# Patient Record
Sex: FEMALE | Race: WHITE | NOT HISPANIC OR LATINO | URBAN - METROPOLITAN AREA
[De-identification: names, ages, dates, MRNs, and addresses within clinical notes are randomized per-mention and may not be internally consistent; named-entity substitution may affect disease eponyms.]

---

## 2017-04-24 ENCOUNTER — OFFICE (AMBULATORY)
Dept: URBAN - METROPOLITAN AREA CLINIC 64 | Facility: CLINIC | Age: 42
End: 2017-04-24

## 2017-04-24 VITALS — SYSTOLIC BLOOD PRESSURE: 130 MMHG | WEIGHT: 232 LBS | DIASTOLIC BLOOD PRESSURE: 75 MMHG | HEIGHT: 72 IN

## 2017-04-24 DIAGNOSIS — K50.813 CROHN'S DISEASE OF BOTH SMALL AND LARGE INTESTINE WITH FISTU: ICD-10-CM

## 2017-04-24 LAB
ANA W/REFLEX: ANA DIRECT: NEGATIVE
C-REACTIVE PROTEIN, QUANT: 36.3 MG/L — HIGH (ref 0–4.9)
CBC WITH DIFFERENTIAL/PLATELET: BASO (ABSOLUTE): 0.1 X10E3/UL (ref 0–0.2)
CBC WITH DIFFERENTIAL/PLATELET: BASOS: 1 %
CBC WITH DIFFERENTIAL/PLATELET: EOS (ABSOLUTE): 0.1 X10E3/UL (ref 0–0.4)
CBC WITH DIFFERENTIAL/PLATELET: EOS: 1 %
CBC WITH DIFFERENTIAL/PLATELET: HEMATOCRIT: 42.4 % (ref 34–46.6)
CBC WITH DIFFERENTIAL/PLATELET: HEMATOLOGY COMMENTS: (no result)
CBC WITH DIFFERENTIAL/PLATELET: HEMOGLOBIN: 14.5 G/DL (ref 11.1–15.9)
CBC WITH DIFFERENTIAL/PLATELET: IMMATURE CELLS: (no result)
CBC WITH DIFFERENTIAL/PLATELET: IMMATURE GRANS (ABS): 0 X10E3/UL (ref 0–0.1)
CBC WITH DIFFERENTIAL/PLATELET: IMMATURE GRANULOCYTES: 0 %
CBC WITH DIFFERENTIAL/PLATELET: LYMPHS (ABSOLUTE): 2.5 X10E3/UL (ref 0.7–3.1)
CBC WITH DIFFERENTIAL/PLATELET: LYMPHS: 27 %
CBC WITH DIFFERENTIAL/PLATELET: MCH: 29.6 PG (ref 26.6–33)
CBC WITH DIFFERENTIAL/PLATELET: MCHC: 34.2 G/DL (ref 31.5–35.7)
CBC WITH DIFFERENTIAL/PLATELET: MCV: 87 FL (ref 79–97)
CBC WITH DIFFERENTIAL/PLATELET: MONOCYTES(ABSOLUTE): 0.8 X10E3/UL (ref 0.1–0.9)
CBC WITH DIFFERENTIAL/PLATELET: MONOCYTES: 8 %
CBC WITH DIFFERENTIAL/PLATELET: NEUTROPHILS (ABSOLUTE): 5.7 X10E3/UL (ref 1.4–7)
CBC WITH DIFFERENTIAL/PLATELET: NEUTROPHILS: 63 %
CBC WITH DIFFERENTIAL/PLATELET: NRBC: (no result)
CBC WITH DIFFERENTIAL/PLATELET: PLATELETS: 305 X10E3/UL (ref 150–379)
CBC WITH DIFFERENTIAL/PLATELET: RBC: 4.9 X10E6/UL (ref 3.77–5.28)
CBC WITH DIFFERENTIAL/PLATELET: RDW: 15.4 % (ref 12.3–15.4)
CBC WITH DIFFERENTIAL/PLATELET: WBC: 9.2 X10E3/UL (ref 3.4–10.8)
CCP ANTIBODIES IGG/IGA: 10 UNITS (ref 0–19)
COMP. METABOLIC PANEL (14): A/G RATIO: 1.2 (ref 1.2–2.2)
COMP. METABOLIC PANEL (14): ALBUMIN, SERUM: 4.2 G/DL (ref 3.5–5.5)
COMP. METABOLIC PANEL (14): ALKALINE PHOSPHATASE, S: 55 IU/L (ref 39–117)
COMP. METABOLIC PANEL (14): ALT (SGPT): 7 IU/L (ref 0–32)
COMP. METABOLIC PANEL (14): AST (SGOT): 11 IU/L (ref 0–40)
COMP. METABOLIC PANEL (14): BILIRUBIN, TOTAL: 0.3 MG/DL (ref 0–1.2)
COMP. METABOLIC PANEL (14): BUN/CREATININE RATIO: 6 — LOW (ref 9–23)
COMP. METABOLIC PANEL (14): BUN: 5 MG/DL — LOW (ref 6–24)
COMP. METABOLIC PANEL (14): CALCIUM, SERUM: 9.2 MG/DL (ref 8.7–10.2)
COMP. METABOLIC PANEL (14): CARBON DIOXIDE, TOTAL: 19 MMOL/L (ref 18–29)
COMP. METABOLIC PANEL (14): CHLORIDE, SERUM: 101 MMOL/L (ref 96–106)
COMP. METABOLIC PANEL (14): CREATININE, SERUM: 0.84 MG/DL (ref 0.57–1)
COMP. METABOLIC PANEL (14): EGFR IF AFRICN AM: 101 ML/MIN/1.73 (ref 59–?)
COMP. METABOLIC PANEL (14): EGFR IF NONAFRICN AM: 87 ML/MIN/1.73 (ref 59–?)
COMP. METABOLIC PANEL (14): GLOBULIN, TOTAL: 3.4 G/DL (ref 1.5–4.5)
COMP. METABOLIC PANEL (14): GLUCOSE, SERUM: 102 MG/DL — HIGH (ref 65–99)
COMP. METABOLIC PANEL (14): POTASSIUM, SERUM: 3.7 MMOL/L (ref 3.5–5.2)
COMP. METABOLIC PANEL (14): PROTEIN, TOTAL, SERUM: 7.6 G/DL (ref 6–8.5)
COMP. METABOLIC PANEL (14): SODIUM, SERUM: 141 MMOL/L (ref 134–144)
LIPID PANEL: CHOLESTEROL, TOTAL: 108 MG/DL (ref 100–199)
LIPID PANEL: COMMENT: (no result)
LIPID PANEL: HDL CHOLESTEROL: 40 MG/DL (ref 39–?)
LIPID PANEL: LDL CHOLESTEROL CALC: 53 MG/DL (ref 0–99)
LIPID PANEL: TRIGLYCERIDES: 77 MG/DL (ref 0–149)
LIPID PANEL: VLDL CHOLESTEROL CAL: 15 MG/DL (ref 5–40)
RHEUMATOID ARTHRITIS FACTOR: RA LATEX TURBID.: <10 IU/ML
SEDIMENTATION RATE-WESTERGREN: 42 MM/HR — HIGH (ref 0–32)

## 2017-04-24 PROCEDURE — 99244 OFF/OP CNSLTJ NEW/EST MOD 40: CPT | Performed by: NURSE PRACTITIONER

## 2017-04-24 RX ORDER — METRONIDAZOLE 500 MG/1
1500 TABLET, FILM COATED ORAL
Qty: 30 | Refills: 0 | Status: COMPLETED
Start: 2017-04-24 | End: 2017-07-11

## 2017-04-24 RX ORDER — DICYCLOMINE HYDROCHLORIDE 20 MG/1
80 TABLET ORAL
Qty: 120 | Refills: 5 | Status: COMPLETED
Start: 2017-04-24 | End: 2017-11-21

## 2017-04-24 RX ORDER — PROMETHAZINE HYDROCHLORIDE 25 MG/1
TABLET ORAL
Qty: 60 | Refills: 1 | Status: ACTIVE

## 2017-04-24 RX ORDER — ONDANSETRON 4 MG/1
TABLET, ORALLY DISINTEGRATING ORAL
Qty: 45 | Refills: 1 | Status: ACTIVE

## 2017-04-24 RX ORDER — PREDNISONE 10 MG/1
TABLET ORAL
Qty: 105 | Refills: 0 | Status: COMPLETED
Start: 2017-04-24 | End: 2017-07-11

## 2017-07-11 ENCOUNTER — OFFICE (AMBULATORY)
Dept: URBAN - METROPOLITAN AREA CLINIC 64 | Facility: CLINIC | Age: 42
End: 2017-07-11
Payer: COMMERCIAL

## 2017-07-11 VITALS
WEIGHT: 233 LBS | HEART RATE: 95 BPM | DIASTOLIC BLOOD PRESSURE: 82 MMHG | HEIGHT: 72 IN | SYSTOLIC BLOOD PRESSURE: 114 MMHG

## 2017-07-11 DIAGNOSIS — R11.2 NAUSEA WITH VOMITING, UNSPECIFIED: ICD-10-CM

## 2017-07-11 DIAGNOSIS — K50.90 CROHN'S DISEASE, UNSPECIFIED, WITHOUT COMPLICATIONS: ICD-10-CM

## 2017-07-11 DIAGNOSIS — R19.7 DIARRHEA, UNSPECIFIED: ICD-10-CM

## 2017-07-11 DIAGNOSIS — R10.9 UNSPECIFIED ABDOMINAL PAIN: ICD-10-CM

## 2017-07-11 PROCEDURE — 99214 OFFICE O/P EST MOD 30 MIN: CPT | Performed by: NURSE PRACTITIONER

## 2017-07-11 RX ORDER — PREDNISONE 10 MG/1
TABLET ORAL
Qty: 100 | Refills: 0 | Status: COMPLETED
Start: 2017-07-11 | End: 2017-08-14

## 2017-07-25 ENCOUNTER — ON CAMPUS - OUTPATIENT (AMBULATORY)
Dept: URBAN - NONMETROPOLITAN AREA HOSPITAL 6 | Facility: HOSPITAL | Age: 42
End: 2017-07-25
Payer: COMMERCIAL

## 2017-07-25 DIAGNOSIS — K50.10 CROHN'S DISEASE OF LARGE INTESTINE WITHOUT COMPLICATIONS: ICD-10-CM

## 2017-07-25 DIAGNOSIS — R11.2 NAUSEA WITH VOMITING, UNSPECIFIED: ICD-10-CM

## 2017-07-25 DIAGNOSIS — K44.9 DIAPHRAGMATIC HERNIA WITHOUT OBSTRUCTION OR GANGRENE: ICD-10-CM

## 2017-07-25 DIAGNOSIS — D12.6 BENIGN NEOPLASM OF COLON, UNSPECIFIED: ICD-10-CM

## 2017-07-25 DIAGNOSIS — K63.3 ULCER OF INTESTINE: ICD-10-CM

## 2017-07-25 DIAGNOSIS — K29.70 GASTRITIS, UNSPECIFIED, WITHOUT BLEEDING: ICD-10-CM

## 2017-07-25 PROCEDURE — 43239 EGD BIOPSY SINGLE/MULTIPLE: CPT | Performed by: INTERNAL MEDICINE

## 2017-07-25 PROCEDURE — 45380 COLONOSCOPY AND BIOPSY: CPT | Performed by: INTERNAL MEDICINE

## 2017-08-14 ENCOUNTER — OFFICE (AMBULATORY)
Dept: URBAN - METROPOLITAN AREA CLINIC 64 | Facility: CLINIC | Age: 42
End: 2017-08-14
Payer: COMMERCIAL

## 2017-08-14 VITALS
WEIGHT: 235 LBS | HEIGHT: 72 IN | HEART RATE: 94 BPM | DIASTOLIC BLOOD PRESSURE: 63 MMHG | SYSTOLIC BLOOD PRESSURE: 144 MMHG

## 2017-08-14 DIAGNOSIS — R11.2 NAUSEA WITH VOMITING, UNSPECIFIED: ICD-10-CM

## 2017-08-14 DIAGNOSIS — K50.90 CROHN'S DISEASE, UNSPECIFIED, WITHOUT COMPLICATIONS: ICD-10-CM

## 2017-08-14 DIAGNOSIS — K59.1 FUNCTIONAL DIARRHEA: ICD-10-CM

## 2017-08-14 DIAGNOSIS — R10.84 GENERALIZED ABDOMINAL PAIN: ICD-10-CM

## 2017-08-14 PROCEDURE — 99214 OFFICE O/P EST MOD 30 MIN: CPT | Performed by: NURSE PRACTITIONER

## 2017-08-14 RX ORDER — PROMETHAZINE HYDROCHLORIDE 25 MG/1
TABLET ORAL
Qty: 60 | Refills: 1 | Status: ACTIVE

## 2017-08-14 RX ORDER — CYCLOBENZAPRINE HCL 10 MG
TABLET ORAL
Qty: 45 | Refills: 1 | Status: COMPLETED
End: 2018-12-06

## 2017-08-14 RX ORDER — ONDANSETRON HYDROCHLORIDE 4 MG/1
16 TABLET, ORALLY DISINTEGRATING ORAL
Qty: 60 | Refills: 3 | Status: COMPLETED
Start: 2017-08-14 | End: 2018-04-11

## 2017-09-19 ENCOUNTER — OFFICE (AMBULATORY)
Dept: URBAN - METROPOLITAN AREA CLINIC 64 | Facility: CLINIC | Age: 42
End: 2017-09-19
Payer: COMMERCIAL

## 2017-09-19 VITALS
WEIGHT: 228 LBS | HEART RATE: 115 BPM | SYSTOLIC BLOOD PRESSURE: 132 MMHG | HEIGHT: 72 IN | DIASTOLIC BLOOD PRESSURE: 93 MMHG

## 2017-09-19 DIAGNOSIS — K50.119 CROHN'S DISEASE OF LARGE INTESTINE WITH UNSPECIFIED COMPLICA: ICD-10-CM

## 2017-09-19 DIAGNOSIS — M06.9 RHEUMATOID ARTHRITIS, UNSPECIFIED: ICD-10-CM

## 2017-09-19 DIAGNOSIS — K31.84 GASTROPARESIS: ICD-10-CM

## 2017-09-19 DIAGNOSIS — R30.0 DYSURIA: ICD-10-CM

## 2017-09-19 LAB
CBC WITH DIFFERENTIAL/PLATELET: BASO (ABSOLUTE): 0.1 X10E3/UL (ref 0–0.2)
CBC WITH DIFFERENTIAL/PLATELET: BASOS: 1 %
CBC WITH DIFFERENTIAL/PLATELET: EOS (ABSOLUTE): 0.4 X10E3/UL (ref 0–0.4)
CBC WITH DIFFERENTIAL/PLATELET: EOS: 4 %
CBC WITH DIFFERENTIAL/PLATELET: HEMATOCRIT: 40.7 % (ref 34–46.6)
CBC WITH DIFFERENTIAL/PLATELET: HEMATOLOGY COMMENTS: (no result)
CBC WITH DIFFERENTIAL/PLATELET: HEMOGLOBIN: 13.2 G/DL (ref 11.1–15.9)
CBC WITH DIFFERENTIAL/PLATELET: IMMATURE CELLS: (no result)
CBC WITH DIFFERENTIAL/PLATELET: IMMATURE GRANS (ABS): 0 X10E3/UL (ref 0–0.1)
CBC WITH DIFFERENTIAL/PLATELET: IMMATURE GRANULOCYTES: 0 %
CBC WITH DIFFERENTIAL/PLATELET: LYMPHS (ABSOLUTE): 2.3 X10E3/UL (ref 0.7–3.1)
CBC WITH DIFFERENTIAL/PLATELET: LYMPHS: 28 %
CBC WITH DIFFERENTIAL/PLATELET: MCH: 28.8 PG (ref 26.6–33)
CBC WITH DIFFERENTIAL/PLATELET: MCHC: 32.4 G/DL (ref 31.5–35.7)
CBC WITH DIFFERENTIAL/PLATELET: MCV: 89 FL (ref 79–97)
CBC WITH DIFFERENTIAL/PLATELET: MONOCYTES(ABSOLUTE): 0.5 X10E3/UL (ref 0.1–0.9)
CBC WITH DIFFERENTIAL/PLATELET: MONOCYTES: 6 %
CBC WITH DIFFERENTIAL/PLATELET: NEUTROPHILS (ABSOLUTE): 5.1 X10E3/UL (ref 1.4–7)
CBC WITH DIFFERENTIAL/PLATELET: NEUTROPHILS: 61 %
CBC WITH DIFFERENTIAL/PLATELET: NRBC: (no result)
CBC WITH DIFFERENTIAL/PLATELET: PLATELETS: 417 X10E3/UL — HIGH (ref 150–379)
CBC WITH DIFFERENTIAL/PLATELET: RBC: 4.59 X10E6/UL (ref 3.77–5.28)
CBC WITH DIFFERENTIAL/PLATELET: RDW: 15 % (ref 12.3–15.4)
CBC WITH DIFFERENTIAL/PLATELET: WBC: 8.3 X10E3/UL (ref 3.4–10.8)
COMP. METABOLIC PANEL (14): A/G RATIO: 1.1 — LOW (ref 1.2–2.2)
COMP. METABOLIC PANEL (14): ALBUMIN, SERUM: 4.1 G/DL (ref 3.5–5.5)
COMP. METABOLIC PANEL (14): ALKALINE PHOSPHATASE, S: 65 IU/L (ref 39–117)
COMP. METABOLIC PANEL (14): ALT (SGPT): 14 IU/L (ref 0–32)
COMP. METABOLIC PANEL (14): AST (SGOT): 15 IU/L (ref 0–40)
COMP. METABOLIC PANEL (14): BILIRUBIN, TOTAL: 0.2 MG/DL (ref 0–1.2)
COMP. METABOLIC PANEL (14): BUN/CREATININE RATIO: 6 — LOW (ref 9–23)
COMP. METABOLIC PANEL (14): BUN: 5 MG/DL — LOW (ref 6–24)
COMP. METABOLIC PANEL (14): CALCIUM, SERUM: 9.4 MG/DL (ref 8.7–10.2)
COMP. METABOLIC PANEL (14): CARBON DIOXIDE, TOTAL: 22 MMOL/L (ref 18–29)
COMP. METABOLIC PANEL (14): CHLORIDE, SERUM: 99 MMOL/L (ref 96–106)
COMP. METABOLIC PANEL (14): CREATININE, SERUM: 0.89 MG/DL (ref 0.57–1)
COMP. METABOLIC PANEL (14): EGFR IF AFRICN AM: 94 ML/MIN/1.73 (ref 59–?)
COMP. METABOLIC PANEL (14): EGFR IF NONAFRICN AM: 81 ML/MIN/1.73 (ref 59–?)
COMP. METABOLIC PANEL (14): GLOBULIN, TOTAL: 3.6 G/DL (ref 1.5–4.5)
COMP. METABOLIC PANEL (14): GLUCOSE, SERUM: 105 MG/DL — HIGH (ref 65–99)
COMP. METABOLIC PANEL (14): POTASSIUM, SERUM: 4.9 MMOL/L (ref 3.5–5.2)
COMP. METABOLIC PANEL (14): PROTEIN, TOTAL, SERUM: 7.7 G/DL (ref 6–8.5)
COMP. METABOLIC PANEL (14): SODIUM, SERUM: 141 MMOL/L (ref 134–144)
HEP B SURFACE AB: HEP B SURFACE AB, QUAL: REACTIVE
Lab: NEGATIVE
MICROSCOPIC EXAMINATION: BACTERIA: (no result)
MICROSCOPIC EXAMINATION: CAST TYPE: (no result)
MICROSCOPIC EXAMINATION: CASTS: (no result)
MICROSCOPIC EXAMINATION: COMMENT: (no result)
MICROSCOPIC EXAMINATION: CRYSTAL TYPE: (no result)
MICROSCOPIC EXAMINATION: CRYSTALS: (no result)
MICROSCOPIC EXAMINATION: EPITHELIAL CELLS (NON RENAL): (no result) /HPF
MICROSCOPIC EXAMINATION: EPITHELIAL CELLS (RENAL): (no result)
MICROSCOPIC EXAMINATION: MUCUS THREADS: PRESENT
MICROSCOPIC EXAMINATION: RBC: (no result) /HPF
MICROSCOPIC EXAMINATION: TRICHOMONAS: (no result)
MICROSCOPIC EXAMINATION: WBC: ABNORMAL /HPF
MICROSCOPIC EXAMINATION: YEAST: (no result)
QUANTIFERON IN TUBE: INTERPRETATION: (no result)
QUANTIFERON IN TUBE: QFT TB AG MINUS NIL VALUE: <0 IU/ML
QUANTIFERON IN TUBE: QUANTIFERON CRITERIA: (no result)
QUANTIFERON IN TUBE: QUANTIFERON MITOGEN VALUE: 8.1 IU/ML
QUANTIFERON IN TUBE: QUANTIFERON NIL VALUE: 0.22 IU/ML
QUANTIFERON IN TUBE: QUANTIFERON TB AG VALUE: 0.14 IU/ML
QUANTIFERON IN TUBE: QUANTIFERON TB GOLD: NEGATIVE
QUANTIFERON TB GOLD (IN TUBE): QUANTIFERON INCUBATION: (no result)
URINALYSIS, ROUTINE: APPEARANCE: CLEAR
URINALYSIS, ROUTINE: BILIRUBIN: NEGATIVE
URINALYSIS, ROUTINE: GLUCOSE: NEGATIVE
URINALYSIS, ROUTINE: KETONES: NEGATIVE
URINALYSIS, ROUTINE: MICROSCOPIC EXAMINATION: (no result)
URINALYSIS, ROUTINE: NITRITE, URINE: POSITIVE
URINALYSIS, ROUTINE: OCCULT BLOOD: ABNORMAL
URINALYSIS, ROUTINE: PH: 6.5 (ref 5–7.5)
URINALYSIS, ROUTINE: PROTEIN: NEGATIVE
URINALYSIS, ROUTINE: SPECIFIC GRAVITY: 1.01 (ref 1–1.03)
URINALYSIS, ROUTINE: URINE-COLOR: YELLOW
URINALYSIS, ROUTINE: UROBILINOGEN,SEMI-QN: 0.2 MG/DL (ref 0.2–1)
URINALYSIS, ROUTINE: WBC ESTERASE: ABNORMAL

## 2017-09-19 PROCEDURE — 99214 OFFICE O/P EST MOD 30 MIN: CPT | Performed by: INTERNAL MEDICINE

## 2017-09-19 RX ORDER — BUDESONIDE 3 MG/1
9 CAPSULE ORAL
Qty: 90 | Refills: 1 | Status: COMPLETED
Start: 2017-09-19 | End: 2018-01-24

## 2017-11-21 ENCOUNTER — OFFICE (AMBULATORY)
Dept: URBAN - METROPOLITAN AREA CLINIC 64 | Facility: CLINIC | Age: 42
End: 2017-11-21
Payer: COMMERCIAL

## 2017-11-21 VITALS
SYSTOLIC BLOOD PRESSURE: 136 MMHG | DIASTOLIC BLOOD PRESSURE: 92 MMHG | WEIGHT: 226 LBS | HEART RATE: 94 BPM | HEIGHT: 72 IN

## 2017-11-21 DIAGNOSIS — K50.119 CROHN'S DISEASE OF LARGE INTESTINE WITH UNSPECIFIED COMPLICA: ICD-10-CM

## 2017-11-21 DIAGNOSIS — K31.84 GASTROPARESIS: ICD-10-CM

## 2017-11-21 DIAGNOSIS — M06.9 RHEUMATOID ARTHRITIS, UNSPECIFIED: ICD-10-CM

## 2017-11-21 PROCEDURE — 99213 OFFICE O/P EST LOW 20 MIN: CPT | Performed by: INTERNAL MEDICINE

## 2017-11-21 RX ORDER — BUDESONIDE 3 MG/1
9 CAPSULE ORAL
Qty: 90 | Refills: 1 | Status: COMPLETED
Start: 2017-11-21 | End: 2018-04-11

## 2018-01-24 ENCOUNTER — OFFICE (AMBULATORY)
Dept: URBAN - METROPOLITAN AREA CLINIC 64 | Facility: CLINIC | Age: 43
End: 2018-01-24
Payer: COMMERCIAL

## 2018-01-24 VITALS
SYSTOLIC BLOOD PRESSURE: 116 MMHG | DIASTOLIC BLOOD PRESSURE: 74 MMHG | HEIGHT: 72 IN | WEIGHT: 218 LBS | HEART RATE: 108 BPM

## 2018-01-24 DIAGNOSIS — K50.813 CROHN'S DISEASE OF BOTH SMALL AND LARGE INTESTINE WITH FISTU: ICD-10-CM

## 2018-01-24 DIAGNOSIS — K31.84 GASTROPARESIS: ICD-10-CM

## 2018-01-24 DIAGNOSIS — K59.1 FUNCTIONAL DIARRHEA: ICD-10-CM

## 2018-01-24 PROCEDURE — 99213 OFFICE O/P EST LOW 20 MIN: CPT | Performed by: INTERNAL MEDICINE

## 2018-04-11 ENCOUNTER — OFFICE (AMBULATORY)
Dept: URBAN - METROPOLITAN AREA CLINIC 64 | Facility: CLINIC | Age: 43
End: 2018-04-11
Payer: COMMERCIAL

## 2018-04-11 VITALS
HEART RATE: 115 BPM | DIASTOLIC BLOOD PRESSURE: 80 MMHG | HEIGHT: 72 IN | SYSTOLIC BLOOD PRESSURE: 128 MMHG | WEIGHT: 211 LBS

## 2018-04-11 DIAGNOSIS — K31.84 GASTROPARESIS: ICD-10-CM

## 2018-04-11 DIAGNOSIS — M06.9 RHEUMATOID ARTHRITIS, UNSPECIFIED: ICD-10-CM

## 2018-04-11 DIAGNOSIS — K50.813 CROHN'S DISEASE OF BOTH SMALL AND LARGE INTESTINE WITH FISTU: ICD-10-CM

## 2018-04-11 PROCEDURE — 99213 OFFICE O/P EST LOW 20 MIN: CPT | Performed by: INTERNAL MEDICINE

## 2018-07-23 ENCOUNTER — HOSPITAL ENCOUNTER (OUTPATIENT)
Dept: LAB | Facility: HOSPITAL | Age: 43
Discharge: HOME | End: 2018-07-23
Attending: INTERNAL MEDICINE | Admitting: INTERNAL MEDICINE

## 2018-07-23 ENCOUNTER — HOSPITAL ENCOUNTER (OUTPATIENT)
Dept: RHEUMATOLOGY | Facility: CLINIC | Age: 43
Discharge: HOME | End: 2018-07-23
Attending: INTERNAL MEDICINE | Admitting: INTERNAL MEDICINE

## 2018-07-23 LAB
ALBUMIN SERPL-MCNC: 3.7 G/DL (ref 3.5–4.8)
ALBUMIN/GLOB SERPL: 0.9 {RATIO} (ref 1–1.7)
ALP SERPL-CCNC: 46 IU/L (ref 32–91)
ALT SERPL-CCNC: 15 IU/L (ref 14–54)
ANION GAP SERPL CALC-SCNC: 13 MMOL/L (ref 10–20)
AST SERPL-CCNC: 16 IU/L (ref 15–41)
BASOPHILS # BLD AUTO: 0.1 10*3/UL (ref 0–0.2)
BASOPHILS NFR BLD AUTO: 1 % (ref 0–2)
BILIRUB SERPL-MCNC: 0.4 MG/DL (ref 0.3–1.2)
BILIRUB UR QL STRIP: NEGATIVE MG/DL
BUN SERPL-MCNC: 10 MG/DL (ref 8–20)
BUN/CREAT SERPL: 12.5 (ref 5.4–26.2)
CALCIUM SERPL-MCNC: 9.1 MG/DL (ref 8.9–10.3)
CASTS URNS QL MICRO: ABNORMAL /[LPF]
CHLORIDE SERPL-SCNC: 107 MMOL/L (ref 101–111)
COLOR UR: YELLOW
CONV BACTERIA IN URINE MICRO: NEGATIVE
CONV CLARITY OF URINE: CLEAR
CONV CO2: 21 MMOL/L (ref 22–32)
CONV HYALINE CASTS IN URINE MICRO: 1 /[LPF] (ref 0–5)
CONV PROTEIN IN URINE BY AUTOMATED TEST STRIP: NEGATIVE MG/DL
CONV SMALL ROUND CELLS: ABNORMAL /[HPF]
CONV TOTAL PROTEIN: 7.6 G/DL (ref 6.1–7.9)
CONV UROBILINOGEN IN URINE BY AUTOMATED TEST STRIP: 0.2 MG/DL
CREAT UR-MCNC: 0.8 MG/DL (ref 0.4–1)
CRP SERPL-MCNC: 1.78 MG/DL (ref 0–0.7)
CULTURE INDICATED?: ABNORMAL
DIFFERENTIAL METHOD BLD: (no result)
EOSINOPHIL # BLD AUTO: 0.4 10*3/UL (ref 0–0.3)
EOSINOPHIL # BLD AUTO: 6 % (ref 0–3)
ERYTHROCYTE [DISTWIDTH] IN BLOOD BY AUTOMATED COUNT: 16.9 % (ref 11.5–14.5)
ERYTHROCYTE [SEDIMENTATION RATE] IN BLOOD BY WESTERGREN METHOD: 70 MM/HR (ref 0–20)
GLOBULIN UR ELPH-MCNC: 3.9 G/DL (ref 2.5–3.8)
GLUCOSE SERPL-MCNC: 95 MG/DL (ref 65–99)
GLUCOSE UR QL: NEGATIVE MG/DL
HCT VFR BLD AUTO: 36.2 % (ref 35–49)
HGB BLD-MCNC: 12 G/DL (ref 12–15)
HGB UR QL STRIP: ABNORMAL
KETONES UR QL STRIP: NEGATIVE MG/DL
LEUKOCYTE ESTERASE UR QL STRIP: NEGATIVE
LYMPHOCYTES # BLD AUTO: 1.9 10*3/UL (ref 0.8–4.8)
LYMPHOCYTES NFR BLD AUTO: 26 % (ref 18–42)
MCH RBC QN AUTO: 28 PG (ref 26–32)
MCHC RBC AUTO-ENTMCNC: 33.2 G/DL (ref 32–36)
MCV RBC AUTO: 84.3 FL (ref 80–94)
MONOCYTES # BLD AUTO: 0.4 10*3/UL (ref 0.1–1.3)
MONOCYTES NFR BLD AUTO: 6 % (ref 2–11)
NEUTROPHILS # BLD AUTO: 4.3 10*3/UL (ref 2.3–8.6)
NEUTROPHILS NFR BLD AUTO: 61 % (ref 50–75)
NITRITE UR QL STRIP: NEGATIVE
NRBC BLD AUTO-RTO: 0 /100{WBCS}
NRBC/RBC NFR BLD MANUAL: 0 10*3/UL
PH UR STRIP.AUTO: 5.5 [PH] (ref 4.5–8)
PLATELET # BLD AUTO: 398 10*3/UL (ref 150–450)
PMV BLD AUTO: 7.3 FL (ref 7.4–10.4)
POTASSIUM SERPL-SCNC: 4 MMOL/L (ref 3.6–5.1)
RBC # BLD AUTO: 4.3 10*6/UL (ref 4–5.4)
RBC #/AREA URNS HPF: 1 /[HPF] (ref 0–3)
SODIUM SERPL-SCNC: 137 MMOL/L (ref 136–144)
SP GR UR: 1.01 (ref 1–1.03)
SPERM URNS QL MICRO: ABNORMAL /[HPF]
SQUAMOUS SPT QL MICRO: 2 /[HPF] (ref 0–5)
UNIDENT CRYS URNS QL MICRO: ABNORMAL /[HPF]
WBC # BLD AUTO: 7.1 10*3/UL (ref 4.5–11.5)
WBC #/AREA URNS HPF: 9 /[HPF] (ref 0–5)
YEAST SPEC QL WET PREP: ABNORMAL /[HPF]

## 2018-07-23 PROCEDURE — 86481 TB AG RESPONSE T-CELL SUSP: CPT

## 2018-07-24 ENCOUNTER — LAB REQUISITION (OUTPATIENT)
Dept: LAB | Facility: HOSPITAL | Age: 43
End: 2018-07-24

## 2018-07-24 DIAGNOSIS — Z00.00 ROUTINE GENERAL MEDICAL EXAMINATION AT A HEALTH CARE FACILITY: ICD-10-CM

## 2018-07-24 LAB
CONV HIV-1/ HIV-2: NORMAL
CONV HIV-1/ HIV-2: NORMAL
HAV IGM SERPL QL IA: NONREACTIVE
HBV CORE IGM SERPL QL IA: NONREACTIVE
HBV SURFACE AG SERPL QL IA: NONREACTIVE
HCV AB SER DONR QL: NORMAL
HCV AB SER DONR QL: NORMAL
HLA-B27 QL NAA+PROBE: NEGATIVE
INTERPRETATION: NORMAL

## 2018-07-25 LAB
TSPOT INTERPRETATION: NEGATIVE
TSPOT INTERPRETATION: NORMAL
TSPOT NIL CONTROL INTERPRETATION: NORMAL
TSPOT PANEL A: 0
TSPOT PANEL B: 1
TSPOT POS CONTROL INTERPRETATION: NORMAL

## 2018-12-06 ENCOUNTER — OFFICE (AMBULATORY)
Dept: URBAN - METROPOLITAN AREA CLINIC 64 | Facility: CLINIC | Age: 43
End: 2018-12-06
Payer: COMMERCIAL

## 2018-12-06 VITALS
DIASTOLIC BLOOD PRESSURE: 86 MMHG | HEIGHT: 72 IN | WEIGHT: 183 LBS | SYSTOLIC BLOOD PRESSURE: 119 MMHG | HEART RATE: 103 BPM

## 2018-12-06 DIAGNOSIS — R11.2 NAUSEA WITH VOMITING, UNSPECIFIED: ICD-10-CM

## 2018-12-06 DIAGNOSIS — K59.1 FUNCTIONAL DIARRHEA: ICD-10-CM

## 2018-12-06 DIAGNOSIS — K31.84 GASTROPARESIS: ICD-10-CM

## 2018-12-06 DIAGNOSIS — M06.9 RHEUMATOID ARTHRITIS, UNSPECIFIED: ICD-10-CM

## 2018-12-06 DIAGNOSIS — K50.119 CROHN'S DISEASE OF LARGE INTESTINE WITH UNSPECIFIED COMPLICA: ICD-10-CM

## 2018-12-06 DIAGNOSIS — R63.4 ABNORMAL WEIGHT LOSS: ICD-10-CM

## 2018-12-06 PROCEDURE — 99214 OFFICE O/P EST MOD 30 MIN: CPT | Performed by: INTERNAL MEDICINE

## 2018-12-06 RX ORDER — PREDNISONE 10 MG/1
TABLET ORAL
Qty: 105 | Refills: 1 | Status: COMPLETED
Start: 2018-12-06 | End: 2019-02-13

## 2018-12-06 RX ORDER — PANTOPRAZOLE SODIUM 40 MG/1
TABLET, DELAYED RELEASE ORAL
Qty: 30 | Refills: 5 | Status: ACTIVE

## 2019-02-13 ENCOUNTER — OFFICE (AMBULATORY)
Dept: URBAN - METROPOLITAN AREA CLINIC 64 | Facility: CLINIC | Age: 44
End: 2019-02-13
Payer: COMMERCIAL

## 2019-02-13 VITALS
DIASTOLIC BLOOD PRESSURE: 81 MMHG | SYSTOLIC BLOOD PRESSURE: 115 MMHG | HEART RATE: 102 BPM | HEIGHT: 72 IN | WEIGHT: 194 LBS

## 2019-02-13 DIAGNOSIS — J44.9 CHRONIC OBSTRUCTIVE PULMONARY DISEASE, UNSPECIFIED: ICD-10-CM

## 2019-02-13 DIAGNOSIS — K50.119 CROHN'S DISEASE OF LARGE INTESTINE WITH UNSPECIFIED COMPLICA: ICD-10-CM

## 2019-02-13 PROCEDURE — 99213 OFFICE O/P EST LOW 20 MIN: CPT | Performed by: INTERNAL MEDICINE

## 2019-02-17 ENCOUNTER — INPATIENT HOSPITAL (AMBULATORY)
Dept: URBAN - METROPOLITAN AREA HOSPITAL 84 | Facility: HOSPITAL | Age: 44
End: 2019-02-17
Payer: COMMERCIAL

## 2019-02-17 DIAGNOSIS — M06.9 RHEUMATOID ARTHRITIS, UNSPECIFIED: ICD-10-CM

## 2019-02-17 DIAGNOSIS — K20.8 OTHER ESOPHAGITIS: ICD-10-CM

## 2019-02-17 DIAGNOSIS — D64.9 ANEMIA, UNSPECIFIED: ICD-10-CM

## 2019-02-17 DIAGNOSIS — K50.119 CROHN'S DISEASE OF LARGE INTESTINE WITH UNSPECIFIED COMPLICA: ICD-10-CM

## 2019-02-17 DIAGNOSIS — K62.5 HEMORRHAGE OF ANUS AND RECTUM: ICD-10-CM

## 2019-02-17 DIAGNOSIS — K29.70 GASTRITIS, UNSPECIFIED, WITHOUT BLEEDING: ICD-10-CM

## 2019-02-17 DIAGNOSIS — K92.1 MELENA: ICD-10-CM

## 2019-02-17 PROCEDURE — 99222 1ST HOSP IP/OBS MODERATE 55: CPT | Mod: 25 | Performed by: NURSE PRACTITIONER

## 2019-02-17 PROCEDURE — 43235 EGD DIAGNOSTIC BRUSH WASH: CPT | Performed by: INTERNAL MEDICINE

## 2019-02-18 ENCOUNTER — INPATIENT HOSPITAL (AMBULATORY)
Dept: URBAN - METROPOLITAN AREA HOSPITAL 84 | Facility: HOSPITAL | Age: 44
End: 2019-02-18
Payer: COMMERCIAL

## 2019-02-18 DIAGNOSIS — K62.5 HEMORRHAGE OF ANUS AND RECTUM: ICD-10-CM

## 2019-02-18 DIAGNOSIS — K64.4 RESIDUAL HEMORRHOIDAL SKIN TAGS: ICD-10-CM

## 2019-02-18 DIAGNOSIS — K50.10 CROHN'S DISEASE OF LARGE INTESTINE WITHOUT COMPLICATIONS: ICD-10-CM

## 2019-02-18 DIAGNOSIS — K64.1 SECOND DEGREE HEMORRHOIDS: ICD-10-CM

## 2019-02-18 PROCEDURE — 45378 DIAGNOSTIC COLONOSCOPY: CPT | Performed by: INTERNAL MEDICINE

## 2019-02-19 ENCOUNTER — INPATIENT HOSPITAL (AMBULATORY)
Dept: URBAN - METROPOLITAN AREA HOSPITAL 84 | Facility: HOSPITAL | Age: 44
End: 2019-02-19
Payer: COMMERCIAL

## 2019-02-19 DIAGNOSIS — K50.10 CROHN'S DISEASE OF LARGE INTESTINE WITHOUT COMPLICATIONS: ICD-10-CM

## 2019-02-19 PROCEDURE — 99231 SBSQ HOSP IP/OBS SF/LOW 25: CPT | Performed by: NURSE PRACTITIONER

## 2019-02-21 ENCOUNTER — ON CAMPUS - OUTPATIENT (AMBULATORY)
Dept: URBAN - METROPOLITAN AREA HOSPITAL 85 | Facility: HOSPITAL | Age: 44
End: 2019-02-21
Payer: COMMERCIAL

## 2019-02-21 DIAGNOSIS — K50.10 CROHN'S DISEASE OF LARGE INTESTINE WITHOUT COMPLICATIONS: ICD-10-CM

## 2019-02-21 DIAGNOSIS — K92.0 HEMATEMESIS: ICD-10-CM

## 2019-02-21 DIAGNOSIS — R10.84 GENERALIZED ABDOMINAL PAIN: ICD-10-CM

## 2019-02-21 PROCEDURE — 99213 OFFICE O/P EST LOW 20 MIN: CPT | Performed by: NURSE PRACTITIONER

## 2019-03-21 ENCOUNTER — HOSPITAL ENCOUNTER (OUTPATIENT)
Dept: INFUSION THERAPY | Facility: HOSPITAL | Age: 44
Discharge: ANOTHER HOSPITAL | End: 2019-03-21
Attending: INTERNAL MEDICINE | Admitting: INTERNAL MEDICINE

## 2019-03-28 ENCOUNTER — HOSPITAL ENCOUNTER (OUTPATIENT)
Dept: INFUSION THERAPY | Facility: HOSPITAL | Age: 44
Discharge: HOME | End: 2019-03-28
Attending: INTERNAL MEDICINE | Admitting: INTERNAL MEDICINE

## 2019-04-03 ENCOUNTER — OFFICE (AMBULATORY)
Dept: URBAN - METROPOLITAN AREA CLINIC 64 | Facility: CLINIC | Age: 44
End: 2019-04-03
Payer: COMMERCIAL

## 2019-04-03 VITALS
DIASTOLIC BLOOD PRESSURE: 99 MMHG | SYSTOLIC BLOOD PRESSURE: 128 MMHG | WEIGHT: 208 LBS | HEIGHT: 72 IN | HEART RATE: 92 BPM

## 2019-04-03 DIAGNOSIS — K50.119 CROHN'S DISEASE OF LARGE INTESTINE WITH UNSPECIFIED COMPLICA: ICD-10-CM

## 2019-04-03 DIAGNOSIS — M06.9 RHEUMATOID ARTHRITIS, UNSPECIFIED: ICD-10-CM

## 2019-04-03 DIAGNOSIS — D50.0 IRON DEFICIENCY ANEMIA SECONDARY TO BLOOD LOSS (CHRONIC): ICD-10-CM

## 2019-04-03 PROCEDURE — 99214 OFFICE O/P EST MOD 30 MIN: CPT | Performed by: INTERNAL MEDICINE

## 2019-05-22 ENCOUNTER — CONVERSION ENCOUNTER (OUTPATIENT)
Dept: RHEUMATOLOGY | Facility: CLINIC | Age: 44
End: 2019-05-22

## 2019-06-04 VITALS
WEIGHT: 228 LBS | HEART RATE: 107 BPM | DIASTOLIC BLOOD PRESSURE: 94 MMHG | BODY MASS INDEX: 30.88 KG/M2 | HEIGHT: 72 IN | SYSTOLIC BLOOD PRESSURE: 144 MMHG

## 2019-06-06 NOTE — PROGRESS NOTES
Visit Type:  Follow-up Visit    CC:  Hip Pain.    History of Present Illness:  The patient is a 43-year-old female with rheumatoid arthritis coming today in follow-up.  She takes leflunomide 20 mg p.o. daily.  She is also known to have Crohn's disease and uses Humira as prescribed by her gastroenterologist 40 mg every other week.    Since last time I saw the patient, she had sepsis and GI bleeding secondary to her Crohn's disease.  This has completely resolved and she is doing well.  After her discharge, she was seen by pain management with start her on Neurontin and Flexeril for   management of her chronic back pain, which is her most symptomatic area.  If she has failure to this treatment, her pain management doctor is planning to do facet injections.  On today's visit, she complains of pain that is rated 5/10, present in her Rt.    SI joint and both hips.  It is hard for her to sleep at night because if she lays on her side, she has severe pain.    Denies fever or chills, no changes in her weight, no dry eyes or dry mouth, denies nausea or vomiting.  No diarrhea.  All other systems were reviewed and were (-).    No laboratories available for review today.  Were done at [facility].            Rheumatologic history:  1. Rheumatoid arthritis diagnosed in 1990/ankylosing spondylitis diagnosed in 2000  Tried Humira without any major results  Leflunomide started 8/18    2. chronic back pain  X-rays of the spine 07/2000 18-for acute process  Referral to PT 08/2018  PA for MRI 72,018    3. trochanteric bursitis bilateral,  Rt.  > Lt.  Corticosteroid injection 8/18      Considerations in her management  1. History of MS diagnosed in 2007, episodes of optic neuritis    2. history of Crohn's disease failure to:  - 6 mercaptopurine  -Imuran   -Remicade for 18 months colon had a flare of MS   -Humira use since 2007 intermittently, regularly since January 2018. PT aware of risk of MS flare shile on anti TNF  meds.              Past Medical History:     Reviewed history from 07/23/2018 and no changes required:        depression        PTSD        Ankylosing spondylitis        Multiple sclerosis        Suicide attempt        Colitis        rheumatoid arthritis        crohn's disease    Past Surgical History:     Reviewed history from 07/23/2018 and no changes required:        bowel repair resection        gall bladder removal    Family History Summary:      Reviewed history Last on 11/07/2018 and no changes required:05/26/2019  Father - Has Family History of Other Cancer - Entered On: 7/23/2018  Father - Has Family History of Hypertension - Entered On: 7/23/2018  Mother - Has Family History of Other Cancer - Entered On: 7/23/2018  Mother - Has Family History of Hypertension - Entered On: 7/23/2018  Mother - Has Family History of Diabetes - Entered On: 7/23/2018      Social History:     Reviewed history from 11/07/2018 and no changes required:        Patient currently smokes every day.        Patient has been counseled to quit.        Passive Smoke: N        Alcohol Use: Y        Drug Use: Y        HIV/High Risk: N        Regular Exercise: N        Hx Domestic Abuse: N        Orthodoxy Affecting Care: N            Social History Summary:  Patient currently smokes every day.  Patient has been counseled to quit.  Passive Smoke: N  Alcohol Use: Y  Drug Use: Y  HIV/High Risk: N  Regular Exercise: N  Hx Domestic Abuse: N  Orthodoxy Affecting Care: N  Social History Reviewed: 05/22/2019          Risk Factors:     Smoked Tobacco Use:  Current every day smoker     Cigarettes:  Yes  Smokeless Tobacco Use:  Never     Counseled to quit/cut down:  yes  Passive smoke exposure:  no  Drug use:  yes     Substance:  marijuana  HIV high-risk behavior:  no  Caffeine use:  4+ drinks per day  Alcohol use:  yes     Drinks per day:  1  Exercise:  no  Seatbelt use:  100 %  Sun Exposure:  occasionally    Previous Tobacco Use: Signed On -  11/07/2018  Smoked Tobacco Use:  Current every day smoker     Cigarettes:  Yes  Smokeless Tobacco Use:  Never     Counseled to quit/cut down:  yes  Passive smoke exposure:  no  Drug use:  yes     Substance:  marijuana  HIV high-risk behavior:  no  Caffeine use:  4+ drinks per day    Previous Alcohol Use: Signed On - 11/07/2018  Alcohol use:  yes     Drinks per day:  1  Exercise:  no  Seatbelt use:  100 %  Sun Exposure:  occasionally    Active Medications (reviewed today):  BUDESONIDE 3 MG ORAL CAPSULE DELAYED RELEASE PARTICLES (BUDESONIDE) Take one (1) tablet by mouth three times a day  VITAMIN D (ERGOCALCIFEROL) 34321 UNIT ORAL CAPSULE (ERGOCALCIFEROL) Take 1 tab po q weekly for 12 weeks  LEFLUNOMIDE 20 MG ORAL TABLET (LEFLUNOMIDE) Take 1 tablet by mouth daily  LATUDA 40 MG ORAL TABLET (LURASIDONE HCL) 1 po qd  VENTOLIN  (90 BASE) MCG/ACT INHALATION AEROSOL SOLUTION (ALBUTEROL SULFATE) 2 puffs qid prn  PROMETHAZINE HCL 25 MG ORAL TABLET (PROMETHAZINE HCL) 1 po qid prn  ZOFRAN 4 MG ORAL TABLET (ONDANSETRON HCL) 1 po qid prn  GABAPENTIN 300 MG ORAL CAPSULE (GABAPENTIN) bid  WELLBUTRIN  MG ORAL TABLET EXTENDED RELEASE 12 HOUR (BUPROPION HCL) 1 po tid  VIIBRYD 40 MG ORAL TABLET (VILAZODONE HCL)   HUMIRA 40 MG/0.8ML SUBCUTANEOUS PREFILLED SYRINGE KIT (ADALIMUMAB) INJECT 4 PFS (4X40MG) SUBQ ON DAY 1, FOLLOWED BY 2 PFS (2X40MG)SUBQ ON DAY 15    Current Allergies (reviewed today):  * SULFABENZAMIDE (Critical)    Current Medications (including medications started today):   PREVNAR 13 INTRAMUSCULAR SUSPENSION (PNEUMOCOCCAL 13-JOSE CONJ VACC) Inject 1 at pharmacy  BENADRYL ALLERGY 25 MG ORAL TABLET (DIPHENHYDRAMINE HCL) Take one (1) tablet by mouth twice a day  TYLENOL 8 HOUR ARTHRITIS PAIN 650 MG ORAL TABLET EXTENDED RELEASE (ACETAMINOPHEN) Take 1 tab q 6hrs PRN  HYDROXYZINE HCL 50 MG ORAL TABLET (HYDROXYZINE HCL) Take 1 tablet by mouth daily  MESALAMINE 1.2 GM ORAL TABLET DELAYED RELEASE (MESALAMINE) Take 2 tabs  po BID  PROTONIX 40 MG ORAL PACKET (PANTOPRAZOLE SODIUM) Take 1 tablet by mouth daily  CYCLOBENZAPRINE HCL 10 MG ORAL TABLET (CYCLOBENZAPRINE HCL) Take one (1) tablet by mouth twice a day PRN  LEFLUNOMIDE 20 MG ORAL TABLET (LEFLUNOMIDE) Take 1 tablet by mouth daily  LATUDA 40 MG ORAL TABLET (LURASIDONE HCL) 1 po qd  VENTOLIN  (90 BASE) MCG/ACT INHALATION AEROSOL SOLUTION (ALBUTEROL SULFATE) 2 puffs qid prn  PROMETHAZINE HCL 25 MG ORAL TABLET (PROMETHAZINE HCL) 1 po qid prn  ZOFRAN 4 MG ORAL TABLET (ONDANSETRON HCL) 1 po qid prn  GABAPENTIN 300 MG ORAL CAPSULE (GABAPENTIN) Take 300mg in the am 300mg noon and 600mg pm  WELLBUTRIN  MG ORAL TABLET EXTENDED RELEASE 12 HOUR (BUPROPION HCL) 1 po tid  VIIBRYD 40 MG ORAL TABLET (VILAZODONE HCL)   HUMIRA 40 MG/0.8ML SUBCUTANEOUS PREFILLED SYRINGE KIT (ADALIMUMAB) INJECT 4 PFS (4X40MG) SUBQ ON DAY 1, FOLLOWED BY 2 PFS (2X40MG)SUBQ ON DAY 15      Review of Systems        See HPI      Vital Signs:    Patient Profile:    43 Years Old Female  Height:     73 inches (185.42 cm)  Weight:     228 pounds (103.42 kg)  BMI:        30.08     BSA:        2.28  Pulse rate: 107 / minute  BP Sittin / 94  (left arm)    Cuff size:  regular  Patient has a risk of falls? No  Patient in pain?    Yes     Location:    hip pain    Problems: Active problems were reviewed with the patient during this visit.  Medications: Medications were reviewed with the patient during this visit.  Allergies: Allergies were reviewed with the patient during this visit.        Vitals Entered By: Breanne Quintero CMA (May 22, 2019 1:03 PM)      Physical Exam    General:      well developed, well nourished, in no acute distress.    Head:      normocephalic and atraumatic.    Lungs:      clear bilaterally to auscultation.    Heart:      non-displaced PMI, chest non-tender; regular rate and rhythm, S1, S2 without murmurs, rubs, or gallops  Msk:        Mild tenderness in both   Wrists, 1 PIP joint.  There are no  signs of synovitis.  Tenderness in both trochanteric bursae.  Pulses:      pulses normal in all 4 extremities.      Diabetes Management Exam:      Foot Exam (with socks and/or shoes not present):        Pulses:           pulses normal in all 4 extremities.        Blood Pressure:  Today's BP: 144/94 mm Hg            Impression & Recommendations:    Problem # 1:  Inflammatory arthritis (ICD-714.9) (NRH98-Z70.80)  Assessment: Unchanged    Patient with established diagnosis of seropositive rheumatoid arthritis on leflunomide and Humira ( prescribed by her GI doctor for management of Crohn's ).  Overall doing good.  Has tenderness in 3 joints, no synovitis.  Today she is found to have   trochanteric bursitis bilaterally.  Plan to continue with the same treatment with no changes.  No laboratories to calculate the SS 28 score.  Results will be requested.                Cancer screening:  Colonoscopy: 2017 PAP: 2010  Mammogram: NEVER  Bone health: calcium and vitamin D: yes, DXA scan yes 2017   Vaccines: Flu: 9/2018  PNA 23: 7/23/18 Zoster: NO  X-rays of the chest: NO Hands: 7/23/18 Feet: NO Spine Series 7/23/18  Hepatitis panel, HIV, QTB/PPD: 7/23/18 HEP, HIV and TB      Orders:  [facility] [place] CBC W/DIFF; PATH REVIEW IF INDICATED (CBC)  [facility] [place] COMPREHENSIVE METABOLIC PANEL (CMP) (MPC)  FM SEDIMENTATION RATE (ESR)  [facility] [place] CRP C-REACTIVE PROTEIN INFLAMMATION (CRP)  Injection into Large Joint (CPT-46272)  Injection into Large Joint (CPT-00508)  Depo Medrol 40 mg ()  Depo Medrol 40 mg ()      Problem # 2:  Long-term use of high risk medications (ICD-V58.69) (ZAO44-A23.899)    Long-term high-risk medications, on leflunomide and Humira for management of inflammatory arthritis, monitor for side effects per  Orders:  [facility] [place] CBC W/DIFF; PATH REVIEW IF INDICATED (CBC)  [facility] [place] COMPREHENSIVE METABOLIC PANEL (CMP) (MPC)  FM SEDIMENTATION RATE (ESR)  FM CRP C-REACTIVE PROTEIN INFLAMMATION (CRP)      Problem # 3:  Trochanteric bursitis, right  (ICD-726.5) (AFG35-L08.61)   trochanteric bursitis.  Deteriorated.  I will proceed with corticosteroid injections of both trochanteric bursae.    Corticosteroid injection of the trochanteric bursa procedure note:    The risks and the benefits as was the most common side effects were explained in detail to the patient, after the patient voiced consent, the area was prepped in aseptic fashion and the 40 mg of Kenalog +1 mL of 1% lidocaine were inserted in the   trochanteric bursa without any immediate complications.  Blood loss was essentially 0.  The patient tolerated the procedure well and was recommended to contact our office if there were signs of infection of worsening symptoms.    The patient was discharged at baseline with stable vital signs.  Orders:  Injection into Large Joint (CPT-98808)  Depo Medrol 40 mg ()      Problem # 4:  Vitamin D deficiency (ICD-268.9) (KYR00-K91.9)  Assessment: Unchanged  Take calcium vitamin-D.  Recommended to update her bone densitometry due to high risk of osteoporosis.    Problem # 5:  Back pain (ICD-724.5) (GKF57-B27.9)  Assessment: Improved    She failed physical therapy.  Currently she has been seen by pain management.    Problem # 6:  Multiple sclerosis (ICD-340) (JFK15-Q64)  Assessment: Comment Only    She is scheduled to see her neurologist next month, as per the patient, she has been in remission.    Problem # 7:  Crohn's disease (ICD-555.9) (HFS43-U25.90)  Assessment: Comment Only    Followed by her GI, currently on Humira.    Problem # 8:  Hypertension (ICD-401.9) (RHK98-H66)  Assessment: New    New to me.  Recommend to monitor her blood pressure.  She shows me a blood pressure log with her blood pressure has been within normal limits in the last couple of weeks.  Today she is found to be hypertensive.  Recommend close monitoring as she is going   to receive corticosteroid injections.    Medications Added to Medication List This Visit:  1)  Prevnar 13 Intramuscular  Suspension (Pneumococcal 13-charo conj vacc) .... Inject 1 at pharmacy  2)  Benadryl Allergy 25 Mg Oral Tablet (Diphenhydramine hcl) .... Take one (1) tablet by mouth twice a day  3)  Tylenol 8 Hour Arthritis Pain 650 Mg Oral Tablet Extended Release (Acetaminophen) .... Take 1 tab q 6hrs prn  4)  Hydroxyzine Hcl 50 Mg Oral Tablet (Hydroxyzine hcl) .... Take 1 tablet by mouth daily  5)  Mesalamine 1.2 Gm Oral Tablet Delayed Release (Mesalamine) .... Take 2 tabs po bid  6)  Protonix 40 Mg Oral Packet (Pantoprazole sodium) .... Take 1 tablet by mouth daily  7)  Cyclobenzaprine Hcl 10 Mg Oral Tablet (Cyclobenzaprine hcl) .... Take one (1) tablet by mouth twice a day prn  8)  Gabapentin 300 Mg Oral Capsule (Gabapentin) .... Take 300mg in the am 300mg noon and 600mg pm    Other Orders:  [facility] URINALYSIS W/MICROSCOPIC (UAM)      Patient Instructions:  1)  Continue leflunomide 20 mg p.o. daily, recommended to discontinue this medication if she is septic, use cholestyramine as per protocol  2)  Continue with Humira as prescribed by a gastroenterologist  3)  Continue with calcium vitamin-D  4)  Recommend to update DEXA  5)  Monitor blood pressure  6)  Proceed with corticosteroid injection of both trochanteric bursae  7)  Daily low-impact exercises  8)  RTC in 3 months or sooner if needed.                      Medication Administration    Injection # 1:     Medication: Depo Medrol 40mg     Diagnosis: Rheumatoid arthritis, chronic (ICD-714.0) (XGN03-I01.9)     Route: Intra Articular     Site: Left Trochantric Bursa     Exp Date: 05/01/2020     Lot #: [id number]     Given by:      Date Given:05/22/2019 3:01 PM     Comments: Patient tolerated without complications Left Hip     Tolerated w/o complications    Injection # 2:     Medication: Depo Medrol 40mg     Diagnosis: Trochanteric bursitis, right (ICD-726.5) (VRB00-Z73.61)     Route: Intra Articular     Site: RT Trochantric Bursa     Exp Date: 05/01/2020     Lot #:  [id number]     Given by: Dr. Sage     Date Given:05/22/2019 3:04 PM     Comments: Patient tolerated without complications  Rt Hip     Tolerated w/o complications    Orders Added:  1)  [facility] CBC W/DIFF; PATH REVIEW IF INDICATED [CBC]  2)  [facility] COMPREHENSIVE METABOLIC PANEL (CMP) [MPC]  3)  [facility] SEDIMENTATION RATE [ESR]  4)  [facility] CRP C-REACTIVE PROTEIN INFLAMMATION [CRP]  5)  [facility] URINALYSIS W/MICROSCOPIC [UAM]  6)  Ofc Vst-Est Level IV [CPT-92221]  7)  Injection into Large Joint [CPT-43452]  8)  Injection into Large Joint [CPT-99143]  9)  Depo Medrol 40 mg []  10)  Depo Medrol 40 mg []  ]      Electronically signed by Lee Ann Sage MD on 05/26/2019 at 12:17 AM  ________________________________________________________________________       Disclaimer: Converted Note message may not contain all data elements that existed in the legacy source system. Please see [employer] for the original note details.

## 2019-06-12 ENCOUNTER — OFFICE (AMBULATORY)
Dept: URBAN - METROPOLITAN AREA CLINIC 64 | Facility: CLINIC | Age: 44
End: 2019-06-12
Payer: COMMERCIAL

## 2019-06-12 VITALS
HEART RATE: 107 BPM | DIASTOLIC BLOOD PRESSURE: 74 MMHG | HEIGHT: 72 IN | WEIGHT: 233 LBS | SYSTOLIC BLOOD PRESSURE: 129 MMHG

## 2019-06-12 DIAGNOSIS — K50.119 CROHN'S DISEASE OF LARGE INTESTINE WITH UNSPECIFIED COMPLICA: ICD-10-CM

## 2019-06-12 DIAGNOSIS — K59.1 FUNCTIONAL DIARRHEA: ICD-10-CM

## 2019-06-12 PROCEDURE — 99213 OFFICE O/P EST LOW 20 MIN: CPT | Performed by: INTERNAL MEDICINE

## 2019-06-12 RX ORDER — COLESTIPOL HYDROCHLORIDE 1 G/1
3 TABLET, FILM COATED ORAL
Qty: 90 | Refills: 11 | Status: ACTIVE
Start: 2019-06-12

## 2019-10-14 ENCOUNTER — LAB (OUTPATIENT)
Dept: LAB | Facility: HOSPITAL | Age: 44
End: 2019-10-14

## 2019-10-14 ENCOUNTER — TRANSCRIBE ORDERS (OUTPATIENT)
Dept: LAB | Facility: HOSPITAL | Age: 44
End: 2019-10-14

## 2019-10-14 DIAGNOSIS — M13.80 MIGRATORY POLYARTHRITIS: ICD-10-CM

## 2019-10-14 DIAGNOSIS — N39.0 URINARY TRACT INFECTION WITHOUT HEMATURIA, SITE UNSPECIFIED: ICD-10-CM

## 2019-10-14 DIAGNOSIS — Z79.899 ENCOUNTER FOR LONG-TERM (CURRENT) USE OF OTHER MEDICATIONS: Primary | ICD-10-CM

## 2019-10-14 DIAGNOSIS — Z79.899 ENCOUNTER FOR LONG-TERM (CURRENT) USE OF OTHER MEDICATIONS: ICD-10-CM

## 2019-10-14 LAB
ALBUMIN SERPL-MCNC: 4.2 G/DL (ref 3.5–4.8)
ALBUMIN/GLOB SERPL: 1.2 G/DL (ref 1–1.7)
ALP SERPL-CCNC: 65 U/L (ref 32–91)
ALT SERPL W P-5'-P-CCNC: 22 U/L (ref 14–54)
ANION GAP SERPL CALCULATED.3IONS-SCNC: 15.3 MMOL/L (ref 5–15)
AST SERPL-CCNC: 19 U/L (ref 15–41)
BACTERIA UR QL AUTO: ABNORMAL /HPF
BASOPHILS # BLD AUTO: 0.1 10*3/MM3 (ref 0–0.2)
BASOPHILS NFR BLD AUTO: 2 % (ref 0–1.5)
BILIRUB SERPL-MCNC: 0.4 MG/DL (ref 0.3–1.2)
BILIRUB UR QL STRIP: NEGATIVE
BUN BLD-MCNC: 10 MG/DL (ref 8–20)
BUN/CREAT SERPL: 12.5 (ref 5.4–26.2)
CALCIUM SPEC-SCNC: 9.2 MG/DL (ref 8.9–10.3)
CHLORIDE SERPL-SCNC: 102 MMOL/L (ref 101–111)
CLARITY UR: CLEAR
CO2 SERPL-SCNC: 26 MMOL/L (ref 22–32)
COLOR UR: YELLOW
CREAT BLD-MCNC: 0.8 MG/DL (ref 0.4–1)
CRP SERPL-MCNC: 1.55 MG/DL (ref 0–0.7)
DEPRECATED RDW RBC AUTO: 43.3 FL (ref 37–54)
EOSINOPHIL # BLD AUTO: 0.2 10*3/MM3 (ref 0–0.4)
EOSINOPHIL NFR BLD AUTO: 3.6 % (ref 0.3–6.2)
ERYTHROCYTE [DISTWIDTH] IN BLOOD BY AUTOMATED COUNT: 13.4 % (ref 12.3–15.4)
ERYTHROCYTE [SEDIMENTATION RATE] IN BLOOD: 30 MM/HR (ref 0–20)
GFR SERPL CREATININE-BSD FRML MDRD: 79 ML/MIN/1.73
GLOBULIN UR ELPH-MCNC: 3.5 GM/DL (ref 2.5–3.8)
GLUCOSE BLD-MCNC: 104 MG/DL (ref 65–99)
GLUCOSE UR STRIP-MCNC: NEGATIVE MG/DL
HCT VFR BLD AUTO: 41.2 % (ref 34–46.6)
HGB BLD-MCNC: 14 G/DL (ref 12–15.9)
HGB UR QL STRIP.AUTO: ABNORMAL
HYALINE CASTS UR QL AUTO: ABNORMAL /LPF
KETONES UR QL STRIP: NEGATIVE
LEUKOCYTE ESTERASE UR QL STRIP.AUTO: NEGATIVE
LYMPHOCYTES # BLD AUTO: 1.5 10*3/MM3 (ref 0.7–3.1)
LYMPHOCYTES NFR BLD AUTO: 25.4 % (ref 19.6–45.3)
MCH RBC QN AUTO: 31.3 PG (ref 26.6–33)
MCHC RBC AUTO-ENTMCNC: 34 G/DL (ref 31.5–35.7)
MCV RBC AUTO: 92.2 FL (ref 79–97)
MONOCYTES # BLD AUTO: 0.5 10*3/MM3 (ref 0.1–0.9)
MONOCYTES NFR BLD AUTO: 8.7 % (ref 5–12)
NEUTROPHILS # BLD AUTO: 3.5 10*3/MM3 (ref 1.7–7)
NEUTROPHILS NFR BLD AUTO: 60.3 % (ref 42.7–76)
NITRITE UR QL STRIP: NEGATIVE
NRBC BLD AUTO-RTO: 0.1 /100 WBC (ref 0–0.2)
PH UR STRIP.AUTO: 6 [PH] (ref 5–8)
PLATELET # BLD AUTO: 287 10*3/MM3 (ref 140–450)
PMV BLD AUTO: 7.8 FL (ref 6–12)
POTASSIUM BLD-SCNC: 4.3 MMOL/L (ref 3.6–5.1)
PROT SERPL-MCNC: 7.7 G/DL (ref 6.1–7.9)
PROT UR QL STRIP: NEGATIVE
RBC # BLD AUTO: 4.47 10*6/MM3 (ref 3.77–5.28)
RBC # UR: ABNORMAL /HPF
REF LAB TEST METHOD: ABNORMAL
SODIUM BLD-SCNC: 139 MMOL/L (ref 136–144)
SP GR UR STRIP: 1.01 (ref 1–1.03)
SQUAMOUS #/AREA URNS HPF: ABNORMAL /HPF
UROBILINOGEN UR QL STRIP: ABNORMAL
WBC NRBC COR # BLD: 5.8 10*3/MM3 (ref 3.4–10.8)
WBC UR QL AUTO: ABNORMAL /HPF

## 2019-10-14 PROCEDURE — 86140 C-REACTIVE PROTEIN: CPT

## 2019-10-14 PROCEDURE — 36415 COLL VENOUS BLD VENIPUNCTURE: CPT

## 2019-10-14 PROCEDURE — 85025 COMPLETE CBC W/AUTO DIFF WBC: CPT

## 2019-10-14 PROCEDURE — 80053 COMPREHEN METABOLIC PANEL: CPT

## 2019-10-14 PROCEDURE — 81001 URINALYSIS AUTO W/SCOPE: CPT

## 2019-10-14 PROCEDURE — 85652 RBC SED RATE AUTOMATED: CPT

## 2019-10-17 ENCOUNTER — OFFICE VISIT (OUTPATIENT)
Dept: RHEUMATOLOGY | Facility: CLINIC | Age: 44
End: 2019-10-17

## 2019-10-17 VITALS
WEIGHT: 243 LBS | HEIGHT: 72 IN | DIASTOLIC BLOOD PRESSURE: 87 MMHG | SYSTOLIC BLOOD PRESSURE: 125 MMHG | BODY MASS INDEX: 32.91 KG/M2 | HEART RATE: 93 BPM

## 2019-10-17 DIAGNOSIS — K50.919 CROHN'S DISEASE WITH COMPLICATION, UNSPECIFIED GASTROINTESTINAL TRACT LOCATION (HCC): ICD-10-CM

## 2019-10-17 DIAGNOSIS — Z79.899 LONG-TERM USE OF HIGH-RISK MEDICATION: ICD-10-CM

## 2019-10-17 DIAGNOSIS — I10 HYPERTENSION, UNSPECIFIED TYPE: ICD-10-CM

## 2019-10-17 DIAGNOSIS — G89.29 OTHER CHRONIC PAIN: ICD-10-CM

## 2019-10-17 DIAGNOSIS — G35 MS (MULTIPLE SCLEROSIS) (HCC): ICD-10-CM

## 2019-10-17 DIAGNOSIS — E55.9 VITAMIN D DEFICIENCY: ICD-10-CM

## 2019-10-17 DIAGNOSIS — M19.90 INFLAMMATORY ARTHRITIS: Primary | ICD-10-CM

## 2019-10-17 DIAGNOSIS — M70.61 TROCHANTERIC BURSITIS OF RIGHT HIP: ICD-10-CM

## 2019-10-17 PROCEDURE — 20610 DRAIN/INJ JOINT/BURSA W/O US: CPT | Performed by: INTERNAL MEDICINE

## 2019-10-17 PROCEDURE — 99214 OFFICE O/P EST MOD 30 MIN: CPT | Performed by: INTERNAL MEDICINE

## 2019-10-17 RX ORDER — HYDROXYZINE 50 MG/1
TABLET, FILM COATED ORAL EVERY 24 HOURS
COMMUNITY
Start: 2019-05-22 | End: 2020-04-30 | Stop reason: SDUPTHER

## 2019-10-17 RX ORDER — ONDANSETRON 4 MG/1
TABLET, FILM COATED ORAL EVERY 6 HOURS
COMMUNITY
Start: 2018-07-23 | End: 2020-01-21

## 2019-10-17 RX ORDER — VILAZODONE HYDROCHLORIDE 40 MG/1
TABLET ORAL
COMMUNITY
Start: 2018-07-23 | End: 2020-04-30 | Stop reason: SDUPTHER

## 2019-10-17 RX ORDER — ALBUTEROL SULFATE 90 UG/1
2 AEROSOL, METERED RESPIRATORY (INHALATION) AS NEEDED
COMMUNITY
Start: 2018-07-23

## 2019-10-17 RX ORDER — MESALAMINE 1.2 G/1
TABLET, DELAYED RELEASE ORAL EVERY 12 HOURS
COMMUNITY
Start: 2019-05-22 | End: 2022-12-08

## 2019-10-17 RX ORDER — CYCLOBENZAPRINE HCL 10 MG
TABLET ORAL EVERY 12 HOURS
COMMUNITY
Start: 2019-05-22 | End: 2022-12-08

## 2019-10-17 RX ORDER — LURASIDONE HYDROCHLORIDE 40 MG/1
TABLET, FILM COATED ORAL EVERY 24 HOURS
COMMUNITY
Start: 2018-07-23 | End: 2020-04-30 | Stop reason: SDUPTHER

## 2019-10-17 RX ORDER — PROMETHAZINE HYDROCHLORIDE 25 MG/1
25 TABLET ORAL AS NEEDED
COMMUNITY
Start: 2018-07-23

## 2019-10-17 RX ORDER — GABAPENTIN 600 MG/1
600 TABLET ORAL 3 TIMES DAILY
COMMUNITY
End: 2022-12-08

## 2019-10-17 RX ORDER — METHYLPREDNISOLONE ACETATE 40 MG/ML
40 INJECTION, SUSPENSION INTRA-ARTICULAR; INTRALESIONAL; INTRAMUSCULAR; SOFT TISSUE ONCE
Status: COMPLETED | OUTPATIENT
Start: 2019-10-17 | End: 2019-10-17

## 2019-10-17 RX ORDER — DULOXETIN HYDROCHLORIDE 30 MG/1
30 CAPSULE, DELAYED RELEASE ORAL DAILY
COMMUNITY
End: 2020-04-30 | Stop reason: SDUPTHER

## 2019-10-17 RX ORDER — LEFLUNOMIDE 20 MG/1
TABLET ORAL EVERY 24 HOURS
COMMUNITY
Start: 2018-08-08 | End: 2020-02-05

## 2019-10-17 RX ORDER — BUPROPION HYDROCHLORIDE 100 MG/1
TABLET, EXTENDED RELEASE ORAL
COMMUNITY
Start: 2018-07-23 | End: 2020-04-30 | Stop reason: SDUPTHER

## 2019-10-17 RX ORDER — PANTOPRAZOLE SODIUM 40 MG/1
GRANULE, DELAYED RELEASE ORAL
COMMUNITY
Start: 2019-05-22 | End: 2020-01-21

## 2019-10-17 RX ORDER — MONTELUKAST SODIUM 4 MG/1
1 TABLET, CHEWABLE ORAL 2 TIMES DAILY
COMMUNITY
End: 2022-12-08

## 2019-10-17 RX ADMIN — METHYLPREDNISOLONE ACETATE 40 MG: 40 INJECTION, SUSPENSION INTRA-ARTICULAR; INTRALESIONAL; INTRAMUSCULAR; SOFT TISSUE at 16:55

## 2019-10-17 NOTE — PROGRESS NOTES
The patient is a very pleasant 43-year-old female who comes today in follow-up for management of rheumatoid arthritis.  She suffers from Crohn's disease and takes Humira as per her GI doctor.  She also takes leflunomide for management of her inflammatory arthropathy.  She is compliant with this treatment.  Denies major side effects.    Last time she was seen in the office was 5/30/2019, at that time, there were no changes made to her treatment.     She has MS and the patient was going to be evaluated by her new neurologist, she claims that she has not been seen yet but according to her, her MS is well controlled.    She has been doing overall very well with the treatment, the patient reports joint pain that is present most of the times, she has been doing very well up until a couple of weeks when we started having weather changes, today the joint pain is reported as 4 out of 10, her worst joints are in her knees, her hips and her hands.  She has morning stiffness at times but overall feels that the medications are working well. Her right trochanteric bursa has started to hurt again.  She received a corticosteroid injection months ago with good results, she requests a new injection to help with this problem. She goes to her pain management Dr. [facility], she wants to change her care to another pain management Dr.    Review of systems is negative for fever or chills, she has some nausea and cough that she attributes to her history of allergies.  She has been able to lose some weight, she has dry eyes and dry mouth, she does not use anything  to help with this problem except for water.  Denies oral ulcers, denies dysuria, urinary frequency, no skin rashes.    Laboratory exam for this visit show creatinine 0.8, liver function test normal, CRP 1.55 (0.7), ESR 30 (20), CBC and UA normal.    Social and family history reviewed and unchanged.    Rheumatologic history:  1. Rheumatoid arthritis diagnosed in 1990/ankylosing  spondylitis diagnosed in 2000  Tried Humira without any major results  Leflunomide started 8/18     2. chronic back pain  X-rays of the spine 07/2000 18-for acute process  Referral to PT 08/2018  PA for MRI 72,018  PAIN MANAGMENT     3. trochanteric bursitis bilateral,  Rt.  > Lt.  Corticosteroid injection 8/18        Considerations in her management  1. History of MS diagnosed in 2007, episodes of optic neuritis     2. history of Crohn's disease failure to:  - 6 mercaptopurine  -Imuran   -Remicade for 18 months colon had a flare of MS   -Humira use since 2007 intermittently, regularly since January 2018. PT aware of risk of MS flare shile on anti TNF meds.                    Past Medical History:     Reviewed history from 07/23/2018 and no changes required:        depression        PTSD        Ankylosing spondylitis        Multiple sclerosis        Suicide attempt        Colitis        rheumatoid arthritis        crohn's disease     Past Surgical History:     Reviewed history from 07/23/2018 and no changes required:        bowel repair resection        gall bladder removal       Physical examination:  Vitals:    10/17/19 1211   BP: 125/87   Pulse: 93     GENERAL: Well-developed, well-nourished in no acute distress. Alert and oriented x3.  HEENT: Normocephalic, atraumatic. Pupils are equal, round, and reactive to light. Extraocular muscles are intact. Mucous membranes are pink and moist. Nostrils are clear.   NECK: Supple without lymphadenopathy.  LUNGS: Clear to auscultation bilaterally.  HEART: Regular rate and rhythm without murmur, rub or gallop.  CHEST: Respirations easy and unlabored.  EXTREMITIES: No cyanosis, edema or clubbing.  SKIN: Warm, dry and intact.  MSK: Minimal tenderness in both wrists, 3 MCPs and 2 PIP joints.  No synovitis.  Tenderness in the right trochanteric bursa.    Assessment:  1.  Rheumatoid arthritis.  She continues to be symptomatic, her physical exam shows mild tenderness in the few  joints. Although her sedimentation rate is elevated, she has other probable causes of this abnormal laboratory test,  including her Crohn's disease, her very poor dentition between others. I believe that the current medications are working well.  I will continue with the same treatment with no changes.  We will do an ultrasound of the hands prior to her next appointment to look for subclinical synovitis.      2.  Long-term high-risk medications, the patient is on medications for management of inflammatory arthritis, I am monitoring for side effects.      Cancer screening:  Colonoscopy: 02/2019 PAP: 2010  Mammogram: NEVER  Bone health: calcium and vitamin D: yes, DXA scan yes 2017   Vaccines: Flu: 9/2018  PNA 13- 06/1/2019 Zoster: NO  X-rays of the chest: NO Hands: 7/23/18 Feet: NO Spine Series 7/23/18  Hepatitis panel, HIV, QTB/PPD: 7/23/18 HEP, HIV and TB      #3 trochanteric bursitis on the right.  The patient requires a corticosteroid injection in the right trochanteric bursa.  We will proceed with this treatment.    4.  Vitamin D deficiency.  The patient takes vitamin D occasionally.  Recommended regarding compliance.    5.  Multiple sclerosis.  She has not been seen by her new neurologist yet.    6.  Crohn's disease, as per the patient doing well on current treatment with Humira.    7.  Chronic pain.  Patient had been seen by Dr. Delacruz in [place], as per the patient he had recommended to her to ask for a second opinion.  She requests referral to another pain management doctor    8.  Hypertension.  Recommended to monitor blood pressure regularly.    Plan: Continue with leflunomide 20 mg p.o. daily  Continue with Humira as per GI doctor  Continue calcium and vitamin D  She gets her bone densitometry is by her general practitioner, she has a history of osteopenia and recommended to update I will proceed with corticosteroid injection of the right trochanteric bursa.  RTC in 3 months or sooner if needed.

## 2020-01-17 PROBLEM — M45.9 ANKYLOSING SPONDYLITIS: Status: ACTIVE | Noted: 2018-07-23

## 2020-01-17 PROBLEM — M54.10 RADICULOPATHY: Status: ACTIVE | Noted: 2018-08-08

## 2020-01-17 PROBLEM — M70.61 TROCHANTERIC BURSITIS OF RIGHT HIP: Status: ACTIVE | Noted: 2018-08-08

## 2020-01-17 PROBLEM — M25.551 PAIN OF RIGHT HIP JOINT: Status: ACTIVE | Noted: 2018-08-08

## 2020-01-17 PROBLEM — M19.90 INFLAMMATORY ARTHRITIS: Status: ACTIVE | Noted: 2018-07-23

## 2020-01-17 PROBLEM — N39.0 URINARY TRACT INFECTION: Status: ACTIVE | Noted: 2018-08-08

## 2020-01-17 PROBLEM — G35 MULTIPLE SCLEROSIS (HCC): Status: ACTIVE | Noted: 2019-05-22

## 2020-01-17 PROBLEM — R53.83 FATIGUE: Status: ACTIVE | Noted: 2018-07-23

## 2020-01-17 PROBLEM — F17.200 CURRENT EVERY DAY SMOKER: Status: ACTIVE | Noted: 2020-01-17

## 2020-01-17 PROBLEM — M54.9 BACK PAIN: Status: ACTIVE | Noted: 2018-08-08

## 2020-01-17 PROBLEM — Z79.52 LONG TERM CURRENT USE OF SYSTEMIC STEROIDS: Status: ACTIVE | Noted: 2018-07-23

## 2020-01-17 PROBLEM — E55.9 VITAMIN D DEFICIENCY: Status: ACTIVE | Noted: 2018-08-08

## 2020-01-17 PROBLEM — M06.9 RHEUMATOID ARTHRITIS (HCC): Status: ACTIVE | Noted: 2018-08-08

## 2020-01-17 PROBLEM — I10 HYPERTENSION: Status: ACTIVE | Noted: 2019-05-22

## 2020-01-17 PROBLEM — D84.9 IMMUNOSUPPRESSIVE DISEASE (HCC): Status: ACTIVE | Noted: 2020-01-17

## 2020-01-17 PROBLEM — K50.90 CROHN'S DISEASE (HCC): Status: ACTIVE | Noted: 2019-05-22

## 2020-01-17 PROBLEM — E66.9 OBESITY: Status: ACTIVE | Noted: 2018-07-23

## 2020-01-17 PROBLEM — J30.2 SEASONAL ALLERGIES: Status: ACTIVE | Noted: 2020-01-17

## 2020-01-17 PROBLEM — J45.20 ALLERGY-INDUCED ASTHMA, MILD INTERMITTENT, UNCOMPLICATED: Status: ACTIVE | Noted: 2020-01-17

## 2020-01-17 PROBLEM — Z79.899 OTHER LONG TERM (CURRENT) DRUG THERAPY: Status: ACTIVE | Noted: 2018-11-07

## 2020-01-17 RX ORDER — SENNOSIDES 8.6 MG
CAPSULE ORAL EVERY 6 HOURS
COMMUNITY
Start: 2019-05-22

## 2020-01-17 RX ORDER — CHLORPHENIRAMINE MALEATE 4 MG/1
4 TABLET ORAL AS NEEDED
COMMUNITY

## 2020-01-17 RX ORDER — OXYCODONE HYDROCHLORIDE 30 MG/1
30 TABLET ORAL
COMMUNITY
End: 2020-01-21

## 2020-01-17 RX ORDER — PREGABALIN 225 MG/1
225 CAPSULE ORAL
COMMUNITY
End: 2020-01-21

## 2020-01-17 RX ORDER — FENTANYL 50 UG/H
1 PATCH TRANSDERMAL
COMMUNITY
End: 2020-01-21

## 2020-01-21 ENCOUNTER — LAB (OUTPATIENT)
Dept: LAB | Facility: HOSPITAL | Age: 45
End: 2020-01-21

## 2020-01-21 ENCOUNTER — OFFICE VISIT (OUTPATIENT)
Dept: RHEUMATOLOGY | Facility: CLINIC | Age: 45
End: 2020-01-21

## 2020-01-21 VITALS
HEIGHT: 72 IN | WEIGHT: 237 LBS | SYSTOLIC BLOOD PRESSURE: 124 MMHG | BODY MASS INDEX: 32.1 KG/M2 | HEART RATE: 91 BPM | DIASTOLIC BLOOD PRESSURE: 90 MMHG

## 2020-01-21 DIAGNOSIS — E55.9 VITAMIN D DEFICIENCY: ICD-10-CM

## 2020-01-21 DIAGNOSIS — K50.919 CROHN'S DISEASE WITH COMPLICATION, UNSPECIFIED GASTROINTESTINAL TRACT LOCATION (HCC): ICD-10-CM

## 2020-01-21 DIAGNOSIS — M19.90 INFLAMMATORY ARTHRITIS: Primary | ICD-10-CM

## 2020-01-21 DIAGNOSIS — M19.90 INFLAMMATORY ARTHRITIS: ICD-10-CM

## 2020-01-21 LAB
25(OH)D3 SERPL-MCNC: 15.7 NG/ML (ref 30–100)
ALBUMIN SERPL-MCNC: 4.6 G/DL (ref 3.5–5.2)
ALBUMIN/GLOB SERPL: 1.2 G/DL
ALP SERPL-CCNC: 66 U/L (ref 39–117)
ALT SERPL W P-5'-P-CCNC: 15 U/L (ref 1–33)
ANION GAP SERPL CALCULATED.3IONS-SCNC: 13.9 MMOL/L (ref 5–15)
AST SERPL-CCNC: 15 U/L (ref 1–32)
BASOPHILS # BLD AUTO: 0.11 10*3/MM3 (ref 0–0.2)
BASOPHILS NFR BLD AUTO: 1.4 % (ref 0–1.5)
BILIRUB SERPL-MCNC: 0.3 MG/DL (ref 0.2–1.2)
BUN BLD-MCNC: 7 MG/DL (ref 6–20)
BUN/CREAT SERPL: 8.4 (ref 7–25)
CALCIUM SPEC-SCNC: 9.8 MG/DL (ref 8.6–10.5)
CHLORIDE SERPL-SCNC: 99 MMOL/L (ref 98–107)
CHROMATIN AB SERPL-ACNC: <10 IU/ML (ref 0–14)
CO2 SERPL-SCNC: 23.1 MMOL/L (ref 22–29)
CREAT BLD-MCNC: 0.83 MG/DL (ref 0.57–1)
CRP SERPL-MCNC: 1.02 MG/DL (ref 0–0.5)
DEPRECATED RDW RBC AUTO: 47.2 FL (ref 37–54)
EOSINOPHIL # BLD AUTO: 0.28 10*3/MM3 (ref 0–0.4)
EOSINOPHIL NFR BLD AUTO: 3.7 % (ref 0.3–6.2)
ERYTHROCYTE [DISTWIDTH] IN BLOOD BY AUTOMATED COUNT: 14.3 % (ref 12.3–15.4)
ERYTHROCYTE [SEDIMENTATION RATE] IN BLOOD: 32 MM/HR (ref 0–20)
GFR SERPL CREATININE-BSD FRML MDRD: 75 ML/MIN/1.73
GLOBULIN UR ELPH-MCNC: 3.7 GM/DL
GLUCOSE BLD-MCNC: 99 MG/DL (ref 65–99)
HCT VFR BLD AUTO: 38.9 % (ref 34–46.6)
HGB BLD-MCNC: 13.4 G/DL (ref 12–15.9)
IMM GRANULOCYTES # BLD AUTO: 0.02 10*3/MM3 (ref 0–0.05)
IMM GRANULOCYTES NFR BLD AUTO: 0.3 % (ref 0–0.5)
LYMPHOCYTES # BLD AUTO: 1.79 10*3/MM3 (ref 0.7–3.1)
LYMPHOCYTES NFR BLD AUTO: 23.4 % (ref 19.6–45.3)
MCH RBC QN AUTO: 30.9 PG (ref 26.6–33)
MCHC RBC AUTO-ENTMCNC: 34.4 G/DL (ref 31.5–35.7)
MCV RBC AUTO: 89.8 FL (ref 79–97)
MONOCYTES # BLD AUTO: 0.54 10*3/MM3 (ref 0.1–0.9)
MONOCYTES NFR BLD AUTO: 7.1 % (ref 5–12)
NEUTROPHILS # BLD AUTO: 4.91 10*3/MM3 (ref 1.7–7)
NEUTROPHILS NFR BLD AUTO: 64.1 % (ref 42.7–76)
NRBC BLD AUTO-RTO: 0 /100 WBC (ref 0–0.2)
PLATELET # BLD AUTO: 347 10*3/MM3 (ref 140–450)
PMV BLD AUTO: 9.4 FL (ref 6–12)
POTASSIUM BLD-SCNC: 4.1 MMOL/L (ref 3.5–5.2)
PROT SERPL-MCNC: 8.3 G/DL (ref 6–8.5)
RBC # BLD AUTO: 4.33 10*6/MM3 (ref 3.77–5.28)
SODIUM BLD-SCNC: 136 MMOL/L (ref 136–145)
WBC NRBC COR # BLD: 7.65 10*3/MM3 (ref 3.4–10.8)

## 2020-01-21 PROCEDURE — 86200 CCP ANTIBODY: CPT

## 2020-01-21 PROCEDURE — 85652 RBC SED RATE AUTOMATED: CPT

## 2020-01-21 PROCEDURE — 36415 COLL VENOUS BLD VENIPUNCTURE: CPT

## 2020-01-21 PROCEDURE — 99214 OFFICE O/P EST MOD 30 MIN: CPT | Performed by: NURSE PRACTITIONER

## 2020-01-21 PROCEDURE — 85025 COMPLETE CBC W/AUTO DIFF WBC: CPT

## 2020-01-21 PROCEDURE — 86431 RHEUMATOID FACTOR QUANT: CPT

## 2020-01-21 PROCEDURE — 82306 VITAMIN D 25 HYDROXY: CPT

## 2020-01-21 PROCEDURE — 80053 COMPREHEN METABOLIC PANEL: CPT

## 2020-01-21 PROCEDURE — 86140 C-REACTIVE PROTEIN: CPT

## 2020-01-21 RX ORDER — GABAPENTIN 300 MG/1
CAPSULE ORAL
COMMUNITY
Start: 2019-12-31 | End: 2020-01-21

## 2020-01-21 RX ORDER — PANTOPRAZOLE SODIUM 40 MG/1
TABLET, DELAYED RELEASE ORAL
COMMUNITY
Start: 2020-01-07 | End: 2022-12-08

## 2020-01-21 RX ORDER — CEPHALEXIN 500 MG/1
CAPSULE ORAL
COMMUNITY
Start: 2020-01-16 | End: 2022-12-08

## 2020-01-21 RX ORDER — ADALIMUMAB 40MG/0.4ML
KIT SUBCUTANEOUS
COMMUNITY
Start: 2019-10-23 | End: 2022-12-08

## 2020-01-21 RX ORDER — FLUCONAZOLE 150 MG/1
TABLET ORAL
COMMUNITY
Start: 2020-01-16 | End: 2022-12-08

## 2020-01-21 RX ORDER — ONDANSETRON 4 MG/1
4 TABLET, ORALLY DISINTEGRATING ORAL AS NEEDED
COMMUNITY
Start: 2019-11-23

## 2020-01-21 RX ORDER — HYDROXYZINE HYDROCHLORIDE 25 MG/1
TABLET, FILM COATED ORAL
COMMUNITY
Start: 2019-11-04 | End: 2020-04-30 | Stop reason: SDUPTHER

## 2020-01-21 NOTE — PROGRESS NOTES
Subjective   Rachelle Hummel is a 44 y.o. female.     History of Present Illness     The patient is a very pleasant 44-year-old female who comes today in follow-up for management of rheumatoid arthritis.  She suffers from Crohn's disease and takes Humira as per her GI doctor.  She also takes leflunomide for management of her inflammatory arthropathy.  She is compliant with this treatment.  Denies major side effects. At the present time she is holding her Humira & Arava due to recent skin infection that she is on antibiotics for. On Keflex, has three more days of therapy. Reports symptoms are better.      Last time she was seen in the office was in October 2019, last laboratories were done in October 2019: CRP was 1.55 (0-0.7), ESR 30 (0-30), CBC unremarkable. Urinalysis showed 1+ blood.   MSK US was ordered- not done. No recent labs for Rheumatology.    She has MS and the patient was going to be evaluated by her new neurologist, she claims that she has not been seen yet but according to her, her MS is well controlled.     Stiffness comes & goes. Sometimes lasts all day. Hands & ankles swell.  Her left knee aches with weather changes. She reports no problems w/ knee is warm weather. Has tried injections per pain management.  She follows pain management for her overall pain, currently taking gabapentin 600 mg TID, cymbalta 20 mg/, flexeril 10 mg po BID PRN Sees a  At [facility], she wants to change her care to another pain management DrWendy Asking for tramadol.      Review of systems intermittent fever and chills at times, she has some nausea and cough that she attributes to her history of allergies.  She has been able to lose some weight, she has dry eyes and dry mouth, she does not use anything  to help with this problem except for water.  Denies oral ulcers, denies dysuria, urinary frequency, no skin rashes. She is under the care of gastroenterology for her Crohn's and reports she is currently in remission.  The remainder  of the review of systems reviewed and are (-)     Social and family history reviewed and unchanged.     Rheumatologic history:  1. Rheumatoid arthritis diagnosed in 1990/ankylosing spondylitis diagnosed in 2000  Tried Humira without any major results  Leflunomide started 8/18     2. chronic back pain  X-rays of the spine 07/2000 18-for acute process  Referral to PT 08/2018  PA for MRI 72,018  PAIN MANAGMENT     3. trochanteric bursitis bilateral,  Rt.  > Lt.  Corticosteroid injection 8/18        Considerations in her management  1. History of MS diagnosed in 2007, episodes of optic neuritis     2. history of Crohn's disease failure to:  - 6 mercaptopurine  -Imuran   -Remicade for 18 months colon had a flare of MS   -Humira use since 2007 intermittently, regularly since January 2018. PT aware of risk of MS flare shile on anti TNF meds.     The following portions of the patient's history were reviewed and updated as appropriate: allergies, current medications, past family history, past medical history, past social history, past surgical history and problem list.    Review of Systems  See HPI    Objective   Physical Exam   Constitutional: She is oriented to person, place, and time. She appears well-developed and well-nourished.   HENT:   Head: Normocephalic.   Nose: Nose normal.   Eyes: Pupils are equal, round, and reactive to light. Conjunctivae are normal.   Cardiovascular: Normal rate and regular rhythm.   Pulmonary/Chest: Effort normal and breath sounds normal.   Musculoskeletal:   She has decreased ROM over the lumbar spine, mild over the left knee  She is tender on examination over the bilateral PIPs,left knee and lumbar.  I cannot appreciate any swelling or synovitis on examination today. She does have trigger pt tenderness over the bilateral trapezius & paraspinal muscles.   Neurological: She is alert and oriented to person, place, and time.   Skin: Skin is warm.         Assessment/Plan   Rachelle was seen today  for joint pain.    Diagnoses and all orders for this visit:    Inflammatory arthritis  Comments:  Humira & Arava on hold; she has skin infection she is taking Keflex  No synovitis on examination. She has multiple tender joints on examination  Labs today.  Orders:  -     CBC & Differential; Future  -     Comprehensive Metabolic Panel; Future  -     C-reactive Protein; Future  -     Cyclic Citrul Peptide Antibody, IgG / IgA; Future  -     Rheumatoid Factor; Future  -     Sedimentation Rate; Future  -     Vitamin D 25 Hydroxy; Future  -     XR Hand 3+ View Bilateral; Future    Vitamin D deficiency  Comments:  Check vitamin D level today  Orders:  -     Vitamin D 25 Hydroxy; Future    Crohn's disease with complication, unspecified gastrointestinal tract location (CMS/HCC)  Comments:  She is under the care of GI  Humira per GI          Patient Instructions   Labs today  Therapy on hold d/t being on antiobiotic for skin infection: pt is on Kelfex per PCP  She is on humira for her Crohn's  MSK US & hand x-ray: order given to pt to have this done.   Discussed that we do not prescribe pain medications. She does follow pain management but is requesting referral to different pain management Dr. PATEL 3 months

## 2020-01-21 NOTE — PATIENT INSTRUCTIONS
Labs today  Therapy on hold d/t being on antiobiotic for skin infection: pt is on Kelfex per PCP  She is on humira for her Crohn's  MSK US & hand x-ray: order given to pt to have this done.   Discussed that we do not prescribe pain medications. She does follow pain management but is requesting referral to different pain management Dr. PATEL 3 months

## 2020-01-22 LAB — CCP IGA+IGG SERPL IA-ACNC: 14 UNITS (ref 0–19)

## 2020-01-27 RX ORDER — ERGOCALCIFEROL 1.25 MG/1
50000 CAPSULE ORAL WEEKLY
Qty: 12 CAPSULE | Refills: 0 | Status: SHIPPED | OUTPATIENT
Start: 2020-01-27 | End: 2022-12-08

## 2020-02-05 RX ORDER — LEFLUNOMIDE 20 MG/1
TABLET ORAL
Qty: 90 TABLET | Refills: 0 | Status: SHIPPED | OUTPATIENT
Start: 2020-02-05 | End: 2022-12-08

## 2020-04-30 ENCOUNTER — TELEPHONE (OUTPATIENT)
Dept: PSYCHIATRY | Facility: CLINIC | Age: 45
End: 2020-04-30

## 2020-04-30 ENCOUNTER — TELEMEDICINE (OUTPATIENT)
Dept: PSYCHIATRY | Facility: CLINIC | Age: 45
End: 2020-04-30

## 2020-04-30 DIAGNOSIS — F31.4 BIPOLAR 1 DISORDER, DEPRESSED, SEVERE (HCC): Primary | ICD-10-CM

## 2020-04-30 DIAGNOSIS — F43.12 CHRONIC POSTTRAUMATIC STRESS DISORDER: ICD-10-CM

## 2020-04-30 DIAGNOSIS — F41.1 GENERALIZED ANXIETY DISORDER: ICD-10-CM

## 2020-04-30 PROCEDURE — 90792 PSYCH DIAG EVAL W/MED SRVCS: CPT | Performed by: PSYCHIATRY & NEUROLOGY

## 2020-04-30 RX ORDER — DULOXETIN HYDROCHLORIDE 30 MG/1
30 CAPSULE, DELAYED RELEASE ORAL DAILY
Qty: 90 CAPSULE | Refills: 1 | Status: SHIPPED | OUTPATIENT
Start: 2020-04-30 | End: 2020-08-05 | Stop reason: SDUPTHER

## 2020-04-30 RX ORDER — VILAZODONE HYDROCHLORIDE 40 MG/1
40 TABLET ORAL DAILY
Qty: 90 TABLET | Refills: 1 | Status: SHIPPED | OUTPATIENT
Start: 2020-04-30 | End: 2020-08-05 | Stop reason: SDUPTHER

## 2020-04-30 RX ORDER — HYDROXYZINE HYDROCHLORIDE 25 MG/1
25 TABLET, FILM COATED ORAL 2 TIMES DAILY PRN
Qty: 180 TABLET | Refills: 1 | Status: SHIPPED | OUTPATIENT
Start: 2020-04-30 | End: 2020-08-05 | Stop reason: SDUPTHER

## 2020-04-30 RX ORDER — BUPROPION HYDROCHLORIDE 100 MG/1
100 TABLET, EXTENDED RELEASE ORAL 3 TIMES DAILY
Qty: 270 TABLET | Refills: 1 | Status: SHIPPED | OUTPATIENT
Start: 2020-04-30 | End: 2020-05-01 | Stop reason: SDUPTHER

## 2020-04-30 RX ORDER — DIAZEPAM 5 MG/1
5 TABLET ORAL DAILY PRN
Qty: 30 TABLET | Refills: 2 | Status: SHIPPED | OUTPATIENT
Start: 2020-04-30 | End: 2020-08-05 | Stop reason: SDUPTHER

## 2020-04-30 RX ORDER — LURASIDONE HYDROCHLORIDE 40 MG/1
40 TABLET, FILM COATED ORAL DAILY
Qty: 90 TABLET | Refills: 1 | Status: SHIPPED | OUTPATIENT
Start: 2020-04-30 | End: 2020-08-05 | Stop reason: SDUPTHER

## 2020-04-30 NOTE — PROGRESS NOTES
"Subjective   Rachelle Hummel is a 44 y.o. female who presents today for initial evaluation   Via video chart   Chief Complaint:  \"ongoing problems with depression, PTSD\"     History of Present Illness: the pt suffered from depression for many year,   Was on different meds, was stable recently on latuda, cymbalta, wellbutrin, viibryd.  Meds were prescribed by PCP.  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    PAST PSYCHIATRIC HISTORY  Axis I  Affective/Bipolar Disorder, Anxiety/Panic Disorder, Posttraumatic Stress  inpt in [place] 2 times,   Axis II  Defer     PAST OUTPATIENT TREATMENT  Diagnosis treated:  Affective Disorder, Anxiety/Panic Disorder, Post-Traumatic Stress  [facility] in the past,  Treatment Type:  Psychotherapy (EMDR) , Medication Management  Prior Psychiatric Medications:  zoloft -  \"better than nothing but did not address all problems\"   Elavil - not effective   Valium, Xanax  Effective   Buspar  - not effective   hydroxyzine  Support Groups:  Not now   Sequelae Of Mental Disorder:  social isolation, emotional distress          Interval History  No Change    Side Effects  Denied       Past Medical History:  Past Medical History:   Diagnosis " Date   • Ankylosing spondylitis of site in spine (CMS/HCC)    • Anorexia nervosa    • Arthritis of back    • Autism spectrum disorder    • Bursitis of hip    • Crohn disease (CMS/[facility])    • Depression    • Hip arthrosis    • HTN (hypertension)    • MS (multiple sclerosis) (CMS/[facility])    • Obesity    • PTSD (post-traumatic stress disorder)    • Rheumatoid arthritis (CMS/[facility])    • Vitamin D deficiency        Social History:  Social History     Socioeconomic History   • Marital status:      Spouse name: Juan Cline   • Number of children: Not on file   • Years of education: Not on file   • Highest education level: Not on file   Tobacco Use   • Smoking status: Current Every Day Smoker     Packs/day: 0.25   • Smokeless tobacco: Never Used   • Tobacco comment: cape   Substance and Sexual Activity   • Alcohol use: Yes     Comment: occasionally   • Drug use: Yes     Types: Marijuana     Comment: for pain management    • Sexual activity: Not Currently       Family History:  Family History   Problem Relation Age of Onset   • Diabetes Mother    • Hypertension Mother    • Atrial fibrillation Mother    • Crohn's disease Father    • Heart disease Father        Past Surgical History:  Past Surgical History:   Procedure Laterality Date   • CHOLECYSTECTOMY     • COLON RESECTION         Problem List:  Patient Active Problem List   Diagnosis   • Allergy-induced asthma, mild intermittent, uncomplicated   • Ankylosing spondylitis (CMS/[facility])   • Back pain   • Crohn's disease (CMS/[facility])   • Current every day smoker   • Fatigue   • Hypertension   • Inflammatory arthritis   • Rheumatoid arthritis (CMS/[facility])   • Long term current use of systemic steroids   • Multiple sclerosis (CMS/[facility])   • Immunosuppressive disease (CMS/[facility])   • Obesity   • Other long term (current) drug therapy   • Pain of right hip joint   • Radiculopathy   • Seasonal allergies   • Trochanteric bursitis of right hip   • Urinary tract infection   • Vitamin D deficiency    • Bipolar 1 disorder, depressed, severe (CMS/HCC)   • PTSD (post-traumatic stress disorder)       Allergy:   Allergies   Allergen Reactions   • Sulfa Antibiotics Hives        Discontinued Medications:  Medications Discontinued During This Encounter   Medication Reason   • hydrOXYzine (ATARAX) 50 MG tablet Duplicate order   • buPROPion SR (WELLBUTRIN SR) 100 MG 12 hr tablet Reorder   • DULoxetine (CYMBALTA) 30 MG capsule Reorder   • hydrOXYzine (ATARAX) 25 MG tablet Reorder   • lurasidone (LATUDA) 40 MG tablet tablet Reorder   • vilazodone (VIIBRYD) 40 MG tablet tablet Reorder       Current Medications:   Current Outpatient Medications   Medication Sig Dispense Refill   • acetaminophen (TYLENOL 8 HOUR ARTHRITIS PAIN) 650 MG 8 hr tablet Every 6 (Six) Hours.     • albuterol sulfate HFA (VENTOLIN HFA) 108 (90 Base) MCG/ACT inhaler VENTOLIN  (90 Base) MCG/ACT AERS     • buPROPion SR (Wellbutrin SR) 100 MG 12 hr tablet Take 1 tablet by mouth 3 (Three) Times a Day. 270 tablet 1   • cephalexin (KEFLEX) 500 MG capsule      • chlorpheniramine (CHLOR-TRIMETON) 4 MG tablet Take 4 mg by mouth.     • colestipol (COLESTID) 1 g tablet Take 1 g by mouth 2 (Two) Times a Day.     • cyclobenzaprine (FLEXERIL) 10 MG tablet Every 12 (Twelve) Hours.     • diazePAM (Valium) 5 MG tablet Take 1 tablet by mouth Daily As Needed for Anxiety. 30 tablet 2   • diphenhydrAMINE (BENADRYL ALLERGY) 25 MG tablet Every 12 (Twelve) Hours.     • DULoxetine (CYMBALTA) 30 MG capsule Take 1 capsule by mouth Daily. 90 capsule 1   • fluconazole (DIFLUCAN) 150 MG tablet      • gabapentin (NEURONTIN) 600 MG tablet Take 600 mg by mouth 3 (Three) Times a Day.     • HUMIRA 40 MG/0.4ML Prefilled Syringe Kit injection      • hydrOXYzine (ATARAX) 25 MG tablet Take 1 tablet by mouth 2 (Two) Times a Day As Needed for Anxiety. 180 tablet 1   • leflunomide (ARAVA) 20 MG tablet TAKE ONE TABLET BY MOUTH DAILY 90 tablet 0   • lurasidone (Latuda) 40 MG tablet tablet  Take 1 tablet by mouth Daily. 90 tablet 1   • mesalamine (LIALDA) 1.2 g EC tablet Every 12 (Twelve) Hours.     • mupirocin (BACTROBAN) 2 % ointment      • ondansetron ODT (ZOFRAN-ODT) 4 MG disintegrating tablet      • pantoprazole (PROTONIX) 40 MG EC tablet      • promethazine (PHENERGAN) 25 MG tablet Every 6 (Six) Hours.     • vilazodone (Viibryd) 40 MG tablet tablet Take 1 tablet by mouth Daily. 90 tablet 1   • vitamin D (ERGOCALCIFEROL) 1.25 MG (03644 UT) capsule capsule Take 1 capsule by mouth 1 (One) Time Per Week. 12 capsule 0     No current facility-administered medications for this visit.          Review of Symptoms:    Psychiatric/Behavioral: Negative for agitation, behavioral problems, confusion, decreased concentration, dysphoric mood, hallucinations, self-injury, sleep disturbance and suicidal ideas. The patient is  nervous/anxious and is not hyperactive.        Physical Exam:   There were no vitals taken for this visit.    Mental Status Exam:   Hygiene:   good  Cooperation:  Cooperative  Eye Contact:  Good  Psychomotor Behavior:  Appropriate  Affect:  Blunted  Mood: anxious  Hopelessness: Denies  Speech:  Normal  Thought Process:  Goal directed and Linear  Thought Content:  Normal  Suicidal:  None  Homicidal:  None  Hallucinations:  None  Delusion:  None  Memory:  fair - decreased short term memory   Orientation:  Person, Place, Time and Situation  Reliability:  good  Insight:  Good  Judgement:  Good  Impulse Control:  Good  Physical/Medical Issues:  Yes         PHQ-9 Depression Screening  Little interest or pleasure in doing things? 3   Feeling down, depressed, or hopeless? 2   Trouble falling or staying asleep, or sleeping too much? 2   Feeling tired or having little energy? 2   Poor appetite or overeating? 0   Feeling bad about yourself - or that you are a failure or have let yourself or your family down? 2   Trouble concentrating on things, such as reading the newspaper or watching television? 3    Moving or speaking so slowly that other people could have noticed? Or the opposite - being so fidgety or restless that you have been moving around a lot more than usual? 0   Thoughts that you would be better off dead, or of hurting yourself in some way? 0   PHQ-9 Total Score 14   If you checked off any problems, how difficult have these problems made it for you to do your work, take care of things at home, or get along with other people? Very difficult           Current every day smoker 3-10 mintues spent counseling Has reduced [place] use    I advised Rachelle of the risks of tobacco use.     Lab Results:   No visits with results within 3 Month(s) from this visit.   Latest known visit with results is:   Lab on 01/21/2020   Component Date Value Ref Range Status   • Glucose 01/21/2020 99  65 - 99 mg/dL Final   • BUN 01/21/2020 7  6 - 20 mg/dL Final   • Creatinine 01/21/2020 0.83  0.57 - 1.00 mg/dL Final   • Sodium 01/21/2020 136  136 - 145 mmol/L Final   • Potassium 01/21/2020 4.1  3.5 - 5.2 mmol/L Final   • Chloride 01/21/2020 99  98 - 107 mmol/L Final   • CO2 01/21/2020 23.1  22.0 - 29.0 mmol/L Final   • Calcium 01/21/2020 9.8  8.6 - 10.5 mg/dL Final   • Total Protein 01/21/2020 8.3  6.0 - 8.5 g/dL Final   • Albumin 01/21/2020 4.60  3.50 - 5.20 g/dL Final   • ALT (SGPT) 01/21/2020 15  1 - 33 U/L Final   • AST (SGOT) 01/21/2020 15  1 - 32 U/L Final   • Alkaline Phosphatase 01/21/2020 66  39 - 117 U/L Final   • Total Bilirubin 01/21/2020 0.3  0.2 - 1.2 mg/dL Final   • eGFR Non African Amer 01/21/2020 75  >60 mL/min/1.73 Final   • Globulin 01/21/2020 3.7  gm/dL Final   • A/G Ratio 01/21/2020 1.2  g/dL Final   • BUN/Creatinine Ratio 01/21/2020 8.4  7.0 - 25.0 Final   • Anion Gap 01/21/2020 13.9  5.0 - 15.0 mmol/L Final   • C-Reactive Protein 01/21/2020 1.02* 0.00 - 0.50 mg/dL Final   • CCP Antibodies IgG/IgA 01/21/2020 14  0 - 19 units Final                              Negative               <20                             Weak positive      20 - 39                            Moderate positive  40 - 59                            Strong positive        >59   • Rheumatoid Factor Quantitative 01/21/2020 <10.0  0.0 - 14.0 IU/mL Final   • Sed Rate 01/21/2020 32* 0 - 20 mm/hr Final   • 25 Hydroxy, Vitamin D 01/21/2020 15.7* 30.0 - 100.0 ng/ml Final   • WBC 01/21/2020 7.65  3.40 - 10.80 10*3/mm3 Final   • RBC 01/21/2020 4.33  3.77 - 5.28 10*6/mm3 Final   • Hemoglobin 01/21/2020 13.4  12.0 - 15.9 g/dL Final   • Hematocrit 01/21/2020 38.9  34.0 - 46.6 % Final   • MCV 01/21/2020 89.8  79.0 - 97.0 fL Final   • MCH 01/21/2020 30.9  26.6 - 33.0 pg Final   • MCHC 01/21/2020 34.4  31.5 - 35.7 g/dL Final   • RDW 01/21/2020 14.3  12.3 - 15.4 % Final   • RDW-SD 01/21/2020 47.2  37.0 - 54.0 fl Final   • MPV 01/21/2020 9.4  6.0 - 12.0 fL Final   • Platelets 01/21/2020 347  140 - 450 10*3/mm3 Final   • Neutrophil % 01/21/2020 64.1  42.7 - 76.0 % Final   • Lymphocyte % 01/21/2020 23.4  19.6 - 45.3 % Final   • Monocyte % 01/21/2020 7.1  5.0 - 12.0 % Final   • Eosinophil % 01/21/2020 3.7  0.3 - 6.2 % Final   • Basophil % 01/21/2020 1.4  0.0 - 1.5 % Final   • Immature Grans % 01/21/2020 0.3  0.0 - 0.5 % Final   • Neutrophils, Absolute 01/21/2020 4.91  1.70 - 7.00 10*3/mm3 Final   • Lymphocytes, Absolute 01/21/2020 1.79  0.70 - 3.10 10*3/mm3 Final   • Monocytes, Absolute 01/21/2020 0.54  0.10 - 0.90 10*3/mm3 Final   • Eosinophils, Absolute 01/21/2020 0.28  0.00 - 0.40 10*3/mm3 Final   • Basophils, Absolute 01/21/2020 0.11  0.00 - 0.20 10*3/mm3 Final   • Immature Grans, Absolute 01/21/2020 0.02  0.00 - 0.05 10*3/mm3 Final   • nRBC 01/21/2020 0.0  0.0 - 0.2 /100 WBC Final       Assessment/Plan   Problems Addressed this Visit        Other    Bipolar 1 disorder, depressed, severe (CMS/[facility]) - Primary    Relevant Medications    buPROPion SR (Wellbutrin SR) 100 MG 12 hr tablet    DULoxetine (CYMBALTA) 30 MG capsule    hydrOXYzine (ATARAX) 25 MG tablet     lurasidone (Latuda) 40 MG tablet tablet    vilazodone (Viibryd) 40 MG tablet tablet    diazePAM (Valium) 5 MG tablet      Other Visit Diagnoses     Chronic posttraumatic stress disorder        Relevant Medications    buPROPion SR (Wellbutrin SR) 100 MG 12 hr tablet    DULoxetine (CYMBALTA) 30 MG capsule    hydrOXYzine (ATARAX) 25 MG tablet    lurasidone (Latuda) 40 MG tablet tablet    vilazodone (Viibryd) 40 MG tablet tablet    diazePAM (Valium) 5 MG tablet    Generalized anxiety disorder        Relevant Medications    buPROPion SR (Wellbutrin SR) 100 MG 12 hr tablet    DULoxetine (CYMBALTA) 30 MG capsule    hydrOXYzine (ATARAX) 25 MG tablet    lurasidone (Latuda) 40 MG tablet tablet    vilazodone (Viibryd) 40 MG tablet tablet    diazePAM (Valium) 5 MG tablet          Visit Diagnoses:    ICD-10-CM ICD-9-CM   1. Bipolar 1 disorder, depressed, severe (CMS/HCC) F31.4 296.53   2. Chronic posttraumatic stress disorder F43.12 309.81   3. Generalized anxiety disorder F41.1 300.02       TREATMENT PLAN/GOALS: Continue supportive psychotherapy efforts and medications as indicated. Treatment and medication options discussed during today's visit. Patient ackowledged and verbally consented to continue with current treatment plan and was educated on the importance of compliance with treatment and follow-up appointments.    MEDICATION ISSUES:  The pt was stable on current meds, just reported increased anxiety that affects her BP, GI issues   CONT current meds, add Valium 5 mg QD PRN for anxiety   The pt had psychtherapy including EMDR - did not respond well   INSPECT reviewed as expected - gabapentin   Discussed medication options and treatment plan of prescribed medication as well as the risks, benefits, and side effects including potential falls, possible impaired driving and metabolic adversities among others. Patient is agreeable to call the office with any worsening of symptoms or onset of side effects. Patient is agreeable  to call 911 or go to the nearest ER should he/she begin having SI/HI. No medication side effects or related complaints today.     MEDS ORDERED DURING VISIT:  New Medications Ordered This Visit   Medications   • buPROPion SR (Wellbutrin SR) 100 MG 12 hr tablet     Sig: Take 1 tablet by mouth 3 (Three) Times a Day.     Dispense:  270 tablet     Refill:  1   • DULoxetine (CYMBALTA) 30 MG capsule     Sig: Take 1 capsule by mouth Daily.     Dispense:  90 capsule     Refill:  1   • hydrOXYzine (ATARAX) 25 MG tablet     Sig: Take 1 tablet by mouth 2 (Two) Times a Day As Needed for Anxiety.     Dispense:  180 tablet     Refill:  1   • lurasidone (Latuda) 40 MG tablet tablet     Sig: Take 1 tablet by mouth Daily.     Dispense:  90 tablet     Refill:  1   • vilazodone (Viibryd) 40 MG tablet tablet     Sig: Take 1 tablet by mouth Daily.     Dispense:  90 tablet     Refill:  1   • diazePAM (Valium) 5 MG tablet     Sig: Take 1 tablet by mouth Daily As Needed for Anxiety.     Dispense:  30 tablet     Refill:  2       Return in about 3 months (around 7/30/2020).       This visit has been rescheduled as a phone / video visit to comply with patient safety concerns in accordance with CDC recommendations. Total time of discussion was 31 minutes (total , video followed by phone )     This document has been electronically signed by Roxann Johnson MD  April 30, 2020 08:36

## 2020-05-01 RX ORDER — BUPROPION HYDROCHLORIDE 150 MG/1
150 TABLET, EXTENDED RELEASE ORAL 2 TIMES DAILY
Qty: 180 TABLET | Refills: 1 | Status: SHIPPED | OUTPATIENT
Start: 2020-05-01 | End: 2020-08-05 | Stop reason: SDUPTHER

## 2020-05-01 NOTE — TELEPHONE ENCOUNTER
Call placed to patient to inform a New script for Wellbutrin  mg take 2 times daily was sent to her pharmacy due to insurance not covering 3 tabs daily. And also her script should be ready for  at the pharmacy. I also asked her to pay attention to the new instructions on the new RX and call if she had questions

## 2020-08-05 ENCOUNTER — OFFICE VISIT (OUTPATIENT)
Dept: PSYCHIATRY | Facility: CLINIC | Age: 45
End: 2020-08-05

## 2020-08-05 DIAGNOSIS — F41.1 GENERALIZED ANXIETY DISORDER: ICD-10-CM

## 2020-08-05 DIAGNOSIS — F43.10 PTSD (POST-TRAUMATIC STRESS DISORDER): Primary | ICD-10-CM

## 2020-08-05 DIAGNOSIS — F43.12 CHRONIC POSTTRAUMATIC STRESS DISORDER: ICD-10-CM

## 2020-08-05 DIAGNOSIS — F31.4 BIPOLAR 1 DISORDER, DEPRESSED, SEVERE (HCC): ICD-10-CM

## 2020-08-05 PROCEDURE — 99213 OFFICE O/P EST LOW 20 MIN: CPT | Performed by: PSYCHIATRY & NEUROLOGY

## 2020-08-05 RX ORDER — VILAZODONE HYDROCHLORIDE 40 MG/1
40 TABLET ORAL DAILY
Qty: 90 TABLET | Refills: 1 | Status: SHIPPED | OUTPATIENT
Start: 2020-08-05 | End: 2020-12-02 | Stop reason: SDUPTHER

## 2020-08-05 RX ORDER — DULOXETIN HYDROCHLORIDE 30 MG/1
30 CAPSULE, DELAYED RELEASE ORAL DAILY
Qty: 90 CAPSULE | Refills: 1 | Status: SHIPPED | OUTPATIENT
Start: 2020-08-05 | End: 2020-12-02 | Stop reason: SDUPTHER

## 2020-08-05 RX ORDER — HYDROXYZINE HYDROCHLORIDE 25 MG/1
25 TABLET, FILM COATED ORAL 2 TIMES DAILY PRN
Qty: 180 TABLET | Refills: 1 | Status: SHIPPED | OUTPATIENT
Start: 2020-08-05 | End: 2020-12-02 | Stop reason: SDUPTHER

## 2020-08-05 RX ORDER — DIAZEPAM 5 MG/1
5 TABLET ORAL DAILY PRN
Qty: 30 TABLET | Refills: 3 | Status: SHIPPED | OUTPATIENT
Start: 2020-08-05 | End: 2020-12-02 | Stop reason: SDUPTHER

## 2020-08-05 RX ORDER — BUPROPION HYDROCHLORIDE 150 MG/1
150 TABLET, EXTENDED RELEASE ORAL 2 TIMES DAILY
Qty: 180 TABLET | Refills: 1 | Status: SHIPPED | OUTPATIENT
Start: 2020-08-05 | End: 2020-12-02 | Stop reason: SDUPTHER

## 2020-08-05 RX ORDER — LURASIDONE HYDROCHLORIDE 40 MG/1
40 TABLET, FILM COATED ORAL DAILY
Qty: 90 TABLET | Refills: 1 | Status: SHIPPED | OUTPATIENT
Start: 2020-08-05 | End: 2020-12-02 | Stop reason: SDUPTHER

## 2020-08-05 NOTE — PROGRESS NOTES
"Subjective   Rachelle Hummel is a 44 y.o. female who presents today for follow up   Via phone  You have chosen to receive care through a telephone visit. Do you consent to use a telephone visit for your medical care today? Yes    Chief Complaint:  \"last few weeks its been rough\"     History of Present Illness: the pt suffered from depression for many year,   Was on different meds, was stable recently on latuda, cymbalta, wellbutrin, viibryd.  Meds were prescribed by PCP.    Today the pt reported feeling more depressed due to her friend's death 2 weeks ago, depression is related to situation.   Anxiety - remains intense at times and persistent ,     She worries about different things, unable to relax, denied AVH/SI/HI     Depression 5-6/10, denied feeling hopeless/helpless,     Triggers - negative news, situation at work   Alleviatign factors - to read , to spend time outdoor   Last manic episode - 3-4 years ago   Denied alc use  THC -- for pain management, recommended by PCP     The following portions of the patient's history were reviewed and updated as appropriate: allergies, current medications, past family history, past medical history, past social history, past surgical history and problem list.    PAST PSYCHIATRIC HISTORY  Axis I  Affective/Bipolar Disorder, Anxiety/Panic Disorder, Posttraumatic Stress  inpt in [place] 2 times,   Axis II  Defer     PAST OUTPATIENT TREATMENT  Diagnosis treated:  Affective Disorder, Anxiety/Panic Disorder, Post-Traumatic Stress  [facility] in the past,  Treatment Type:  Psychotherapy (EMDR) , Medication Management  Prior Psychiatric Medications:  zoloft -  \"better than nothing but did not address all problems\"   Elavil - not effective   Valium, Xanax  Effective   Buspar  - not effective   hydroxyzine  Support Groups:  Not now   Sequelae Of Mental Disorder:  social isolation, emotional distress          Interval History  No Change    Side Effects  Denied       Past Medical " History:  Past Medical History:   Diagnosis Date   • Ankylosing spondylitis of site in spine (CMS/HCC)    • Anorexia nervosa    • Arthritis of back    • Autism spectrum disorder    • Bursitis of hip    • Crohn disease (CMS/HCC)    • Depression    • Hip arthrosis    • HTN (hypertension)    • MS (multiple sclerosis) (CMS/HCC)    • Obesity    • PTSD (post-traumatic stress disorder)    • Rheumatoid arthritis (CMS/HCC)    • Vitamin D deficiency        Social History:  Social History     Socioeconomic History   • Marital status:      Spouse name: Juan Cline   • Number of children: Not on file   • Years of education: Not on file   • Highest education level: Not on file   Tobacco Use   • Smoking status: Current Every Day Smoker     Packs/day: 0.25   • Smokeless tobacco: Never Used   • Tobacco comment: cape   Substance and Sexual Activity   • Alcohol use: Yes     Comment: occasionally   • Drug use: Yes     Types: Marijuana     Comment: for pain management    • Sexual activity: Not Currently       Family History:  Family History   Problem Relation Age of Onset   • Diabetes Mother    • Hypertension Mother    • Atrial fibrillation Mother    • Crohn's disease Father    • Heart disease Father        Past Surgical History:  Past Surgical History:   Procedure Laterality Date   • CHOLECYSTECTOMY     • COLON RESECTION         Problem List:  Patient Active Problem List   Diagnosis   • Allergy-induced asthma, mild intermittent, uncomplicated   • Ankylosing spondylitis (CMS/HCC)   • Back pain   • Crohn's disease (CMS/HCC)   • Current every day smoker   • Fatigue   • Hypertension   • Inflammatory arthritis   • Rheumatoid arthritis (CMS/HCC)   • Long term current use of systemic steroids   • Multiple sclerosis (CMS/HCC)   • Immunosuppressive disease (CMS/[facility])   • Obesity   • Other long term (current) drug therapy   • Pain of right hip joint   • Radiculopathy   • Seasonal allergies   • Trochanteric bursitis of right hip   •  Urinary tract infection   • Vitamin D deficiency   • Bipolar 1 disorder, depressed, severe (CMS/HCC)   • PTSD (post-traumatic stress disorder)       Allergy:   Allergies   Allergen Reactions   • Sulfa Antibiotics Hives        Discontinued Medications:  Medications Discontinued During This Encounter   Medication Reason   • buPROPion SR (WELLBUTRIN SR) 150 MG 12 hr tablet Reorder   • DULoxetine (CYMBALTA) 30 MG capsule Reorder   • hydrOXYzine (ATARAX) 25 MG tablet Reorder   • lurasidone (Latuda) 40 MG tablet tablet Reorder   • vilazodone (Viibryd) 40 MG tablet tablet Reorder   • diazePAM (Valium) 5 MG tablet Reorder       Current Medications:   Current Outpatient Medications   Medication Sig Dispense Refill   • acetaminophen (TYLENOL 8 HOUR ARTHRITIS PAIN) 650 MG 8 hr tablet Every 6 (Six) Hours.     • albuterol sulfate HFA (VENTOLIN HFA) 108 (90 Base) MCG/ACT inhaler VENTOLIN  (90 Base) MCG/ACT AERS     • buPROPion SR (WELLBUTRIN SR) 150 MG 12 hr tablet Take 1 tablet by mouth 2 (Two) Times a Day. 180 tablet 1   • cephalexin (KEFLEX) 500 MG capsule      • chlorpheniramine (CHLOR-TRIMETON) 4 MG tablet Take 4 mg by mouth.     • colestipol (COLESTID) 1 g tablet Take 1 g by mouth 2 (Two) Times a Day.     • cyclobenzaprine (FLEXERIL) 10 MG tablet Every 12 (Twelve) Hours.     • diazePAM (Valium) 5 MG tablet Take 1 tablet by mouth Daily As Needed for Anxiety. 30 tablet 3   • diphenhydrAMINE (BENADRYL ALLERGY) 25 MG tablet Every 12 (Twelve) Hours.     • DULoxetine (CYMBALTA) 30 MG capsule Take 1 capsule by mouth Daily. 90 capsule 1   • fluconazole (DIFLUCAN) 150 MG tablet      • gabapentin (NEURONTIN) 600 MG tablet Take 600 mg by mouth 3 (Three) Times a Day.     • HUMIRA 40 MG/0.4ML Prefilled Syringe Kit injection      • hydrOXYzine (ATARAX) 25 MG tablet Take 1 tablet by mouth 2 (Two) Times a Day As Needed for Anxiety. 180 tablet 1   • leflunomide (ARAVA) 20 MG tablet TAKE ONE TABLET BY MOUTH DAILY 90 tablet 0   •  lurasidone (Latuda) 40 MG tablet tablet Take 1 tablet by mouth Daily. 90 tablet 1   • mesalamine (LIALDA) 1.2 g EC tablet Every 12 (Twelve) Hours.     • mupirocin (BACTROBAN) 2 % ointment      • ondansetron ODT (ZOFRAN-ODT) 4 MG disintegrating tablet      • pantoprazole (PROTONIX) 40 MG EC tablet      • promethazine (PHENERGAN) 25 MG tablet Every 6 (Six) Hours.     • vilazodone (Viibryd) 40 MG tablet tablet Take 1 tablet by mouth Daily. 90 tablet 1   • vitamin D (ERGOCALCIFEROL) 1.25 MG (89759 UT) capsule capsule Take 1 capsule by mouth 1 (One) Time Per Week. 12 capsule 0     No current facility-administered medications for this visit.          Review of Symptoms:    Psychiatric/Behavioral: Negative for agitation, behavioral problems, confusion, decreased concentration, dysphoric mood, hallucinations, self-injury, sleep disturbance and suicidal ideas. The patient is  Still nervous/anxious and is not hyperactive.        Physical Exam:   There were no vitals taken for this visit.    Mental Status Exam:   Hygiene:   unable to assess due to phone visit   Cooperation:  Cooperative  Eye Contact:  unable to assess due to phone visit   Psychomotor Behavior:  Appropriate  Affect:  Appropriate  Mood: fluctates  Hopelessness: Denies  Speech:  Normal  Thought Process:  Goal directed and Linear  Thought Content:  Normal  Suicidal:  None  Homicidal:  None  Hallucinations:  None  Delusion:  None  Memory:  fair - decreased short term memory   Orientation:  Person, Place, Time and Situation  Reliability:  good  Insight:  Good  Judgement:  Good  Impulse Control:  Good  Physical/Medical Issues:  Yes       MSE from 4/30/2020 reviewed and accepted with changes     PHQ-9 Depression Screening  Little interest or pleasure in doing things? 2   Feeling down, depressed, or hopeless? 2   Trouble falling or staying asleep, or sleeping too much? 1   Feeling tired or having little energy? 1   Poor appetite or overeating? 0   Feeling bad about  yourself - or that you are a failure or have let yourself or your family down? 1   Trouble concentrating on things, such as reading the newspaper or watching television? 0   Moving or speaking so slowly that other people could have noticed? Or the opposite - being so fidgety or restless that you have been moving around a lot more than usual? 0   Thoughts that you would be better off dead, or of hurting yourself in some way? 0   PHQ-9 Total Score 7   If you checked off any problems, how difficult have these problems made it for you to do your work, take care of things at home, or get along with other people? Very difficult           Current every day smoker 3-10 mintues spent counseling Has reduced [place] use    I advised Rachelle of the risks of tobacco use.     Lab Results:   No visits with results within 3 Month(s) from this visit.   Latest known visit with results is:   Lab on 01/21/2020   Component Date Value Ref Range Status   • Glucose 01/21/2020 99  65 - 99 mg/dL Final   • BUN 01/21/2020 7  6 - 20 mg/dL Final   • Creatinine 01/21/2020 0.83  0.57 - 1.00 mg/dL Final   • Sodium 01/21/2020 136  136 - 145 mmol/L Final   • Potassium 01/21/2020 4.1  3.5 - 5.2 mmol/L Final   • Chloride 01/21/2020 99  98 - 107 mmol/L Final   • CO2 01/21/2020 23.1  22.0 - 29.0 mmol/L Final   • Calcium 01/21/2020 9.8  8.6 - 10.5 mg/dL Final   • Total Protein 01/21/2020 8.3  6.0 - 8.5 g/dL Final   • Albumin 01/21/2020 4.60  3.50 - 5.20 g/dL Final   • ALT (SGPT) 01/21/2020 15  1 - 33 U/L Final   • AST (SGOT) 01/21/2020 15  1 - 32 U/L Final   • Alkaline Phosphatase 01/21/2020 66  39 - 117 U/L Final   • Total Bilirubin 01/21/2020 0.3  0.2 - 1.2 mg/dL Final   • eGFR Non African Amer 01/21/2020 75  >60 mL/min/1.73 Final   • Globulin 01/21/2020 3.7  gm/dL Final   • A/G Ratio 01/21/2020 1.2  g/dL Final   • BUN/Creatinine Ratio 01/21/2020 8.4  7.0 - 25.0 Final   • Anion Gap 01/21/2020 13.9  5.0 - 15.0 mmol/L Final   • C-Reactive Protein  01/21/2020 1.02* 0.00 - 0.50 mg/dL Final   • CCP Antibodies IgG/IgA 01/21/2020 14  0 - 19 units Final                              Negative               <20                            Weak positive      20 - 39                            Moderate positive  40 - 59                            Strong positive        >59   • Rheumatoid Factor Quantitative 01/21/2020 <10.0  0.0 - 14.0 IU/mL Final   • Sed Rate 01/21/2020 32* 0 - 20 mm/hr Final   • 25 Hydroxy, Vitamin D 01/21/2020 15.7* 30.0 - 100.0 ng/ml Final   • WBC 01/21/2020 7.65  3.40 - 10.80 10*3/mm3 Final   • RBC 01/21/2020 4.33  3.77 - 5.28 10*6/mm3 Final   • Hemoglobin 01/21/2020 13.4  12.0 - 15.9 g/dL Final   • Hematocrit 01/21/2020 38.9  34.0 - 46.6 % Final   • MCV 01/21/2020 89.8  79.0 - 97.0 fL Final   • MCH 01/21/2020 30.9  26.6 - 33.0 pg Final   • MCHC 01/21/2020 34.4  31.5 - 35.7 g/dL Final   • RDW 01/21/2020 14.3  12.3 - 15.4 % Final   • RDW-SD 01/21/2020 47.2  37.0 - 54.0 fl Final   • MPV 01/21/2020 9.4  6.0 - 12.0 fL Final   • Platelets 01/21/2020 347  140 - 450 10*3/mm3 Final   • Neutrophil % 01/21/2020 64.1  42.7 - 76.0 % Final   • Lymphocyte % 01/21/2020 23.4  19.6 - 45.3 % Final   • Monocyte % 01/21/2020 7.1  5.0 - 12.0 % Final   • Eosinophil % 01/21/2020 3.7  0.3 - 6.2 % Final   • Basophil % 01/21/2020 1.4  0.0 - 1.5 % Final   • Immature Grans % 01/21/2020 0.3  0.0 - 0.5 % Final   • Neutrophils, Absolute 01/21/2020 4.91  1.70 - 7.00 10*3/mm3 Final   • Lymphocytes, Absolute 01/21/2020 1.79  0.70 - 3.10 10*3/mm3 Final   • Monocytes, Absolute 01/21/2020 0.54  0.10 - 0.90 10*3/mm3 Final   • Eosinophils, Absolute 01/21/2020 0.28  0.00 - 0.40 10*3/mm3 Final   • Basophils, Absolute 01/21/2020 0.11  0.00 - 0.20 10*3/mm3 Final   • Immature Grans, Absolute 01/21/2020 0.02  0.00 - 0.05 10*3/mm3 Final   • nRBC 01/21/2020 0.0  0.0 - 0.2 /100 WBC Final       Assessment/Plan   Problems Addressed this Visit        Other    Bipolar 1 disorder, depressed, severe  (CMS/[facility])    Relevant Medications    buPROPion SR (WELLBUTRIN SR) 150 MG 12 hr tablet    DULoxetine (CYMBALTA) 30 MG capsule    hydrOXYzine (ATARAX) 25 MG tablet    lurasidone (Latuda) 40 MG tablet tablet    vilazodone (Viibryd) 40 MG tablet tablet    diazePAM (Valium) 5 MG tablet    PTSD (post-traumatic stress disorder) - Primary    Relevant Medications    buPROPion SR (WELLBUTRIN SR) 150 MG 12 hr tablet    DULoxetine (CYMBALTA) 30 MG capsule    hydrOXYzine (ATARAX) 25 MG tablet    lurasidone (Latuda) 40 MG tablet tablet    vilazodone (Viibryd) 40 MG tablet tablet    diazePAM (Valium) 5 MG tablet      Other Visit Diagnoses     Chronic posttraumatic stress disorder        Relevant Medications    buPROPion SR (WELLBUTRIN SR) 150 MG 12 hr tablet    DULoxetine (CYMBALTA) 30 MG capsule    hydrOXYzine (ATARAX) 25 MG tablet    lurasidone (Latuda) 40 MG tablet tablet    vilazodone (Viibryd) 40 MG tablet tablet    diazePAM (Valium) 5 MG tablet    Generalized anxiety disorder        Relevant Medications    buPROPion SR (WELLBUTRIN SR) 150 MG 12 hr tablet    DULoxetine (CYMBALTA) 30 MG capsule    hydrOXYzine (ATARAX) 25 MG tablet    lurasidone (Latuda) 40 MG tablet tablet    vilazodone (Viibryd) 40 MG tablet tablet    diazePAM (Valium) 5 MG tablet          Visit Diagnoses:    ICD-10-CM ICD-9-CM   1. PTSD (post-traumatic stress disorder) F43.10 309.81   2. Chronic posttraumatic stress disorder F43.12 309.81   3. Generalized anxiety disorder F41.1 300.02   4. Bipolar 1 disorder, depressed, severe (CMS/HCC) F31.4 296.53       TREATMENT PLAN/GOALS: Continue supportive psychotherapy efforts and medications as indicated. Treatment and medication options discussed during today's visit. Patient ackowledged and verbally consented to continue with current treatment plan and was educated on the importance of compliance with treatment and follow-up appointments.    MEDICATION ISSUES:  The pt was stable on current meds, just reported  increased situational depression anxiety that affects her BP, GI issues   CONT current meds - latuda, viibryd,  Wellbutrin, Valium 5 mg QD PRN for anxiety   The pt had psychtherapy including EMDR - did not respond well   INSPECT reviewed as expected - gabapentin , valium - 7/8/2020 last refill   Discussed medication options and treatment plan of prescribed medication as well as the risks, benefits, and side effects including potential falls, possible impaired driving and metabolic adversities among others. Patient is agreeable to call the office with any worsening of symptoms or onset of side effects. Patient is agreeable to call 911 or go to the nearest ER should he/she begin having SI/HI. No medication side effects or related complaints today.     MEDS ORDERED DURING VISIT:  New Medications Ordered This Visit   Medications   • buPROPion SR (WELLBUTRIN SR) 150 MG 12 hr tablet     Sig: Take 1 tablet by mouth 2 (Two) Times a Day.     Dispense:  180 tablet     Refill:  1   • DULoxetine (CYMBALTA) 30 MG capsule     Sig: Take 1 capsule by mouth Daily.     Dispense:  90 capsule     Refill:  1   • hydrOXYzine (ATARAX) 25 MG tablet     Sig: Take 1 tablet by mouth 2 (Two) Times a Day As Needed for Anxiety.     Dispense:  180 tablet     Refill:  1   • lurasidone (Latuda) 40 MG tablet tablet     Sig: Take 1 tablet by mouth Daily.     Dispense:  90 tablet     Refill:  1   • vilazodone (Viibryd) 40 MG tablet tablet     Sig: Take 1 tablet by mouth Daily.     Dispense:  90 tablet     Refill:  1   • diazePAM (Valium) 5 MG tablet     Sig: Take 1 tablet by mouth Daily As Needed for Anxiety.     Dispense:  30 tablet     Refill:  3       Return in about 4 months (around 12/5/2020).        This visit has been rescheduled as a phone visit to comply with patient safety concerns in accordance with CDC recommendations. Total time of discussion was 14 minutes.    This document has been electronically signed by MD Arley Bustillos  5, 2020 09:13

## 2020-08-05 NOTE — PATIENT INSTRUCTIONS
Bipolar 2 Disorder  Bipolar 2 disorder is a mental health disorder in which a person has episodes of emotional highs (sourav) and lows (depression). Bipolar 2 is different from other bipolar disorders because the manic episodes are not as high and do not last as long. This is called hypomania. People with bipolar 2 disorder usually go back and forth between hypomanic and depressive episodes.  What are the causes?  The cause of this condition is not known.  What increases the risk?  The following factors may make you more likely to develop this condition:  · Having a family member with the disorder.  · An imbalance of certain chemicals in the brain (neurotransmitters).  · Stress, such as a death, illness, or financial problems.  · Certain conditions that affect the brain or spinal cord (neurologic conditions).  · Brain injury (trauma).  · Having another mental health disorder, such as:  ? Obsessive compulsive disorder.  ? Schizophrenia.  What are the signs or symptoms?  Symptoms of hypomania include:  · Very high self-esteem or self-confidence.  · Decreased need for sleep.  · Unusual talkativeness or feeling a need to keep talking. Speech may be very fast. It may seem like you cannot stop talking.  · Racing thoughts or constant talking, with quick shifts between topics that may or may not be related (flight of ideas).  · Decreased ability to focus or concentrate.  · Increased purposeful activity, such as work, studies, or social activity.  · Increased nonproductive activity. This could be pacing, squirming and fidgeting, or finger and toe tapping.  · Impulsive behavior and poor judgment. This may result in high-risk activities, such as having unprotected sex or spending a lot of money.  Symptoms of depression include:  · Feeling sad, hopeless, or helpless.  · Frequent or uncontrollable crying.  · Lack of feeling or caring about anything.  · Sleeping too much.  · Moving more slowly than usual.  · Not being able to  enjoy things you used to enjoy.  · Desire to be alone all the time.  · Feeling guilty or worthless.  · Lack of energy or motivation.  · Trouble concentrating or remembering.  · Trouble making decisions.  · Increased appetite.  · Thoughts of death or desire to harm yourself.  How is this diagnosed?  To diagnose bipolar 2 disorder, your health care provider may ask about your:  · Emotional episodes.  · Medical history.  · Alcohol and drug use. This includes prescription medicines. Certain medical conditions and substances can cause symptoms that seem like bipolar disorder (secondary bipolar disorder).  How is this treated?  Bipolar 2 disorder is a long-term (chronic) illness. It is best controlled with ongoing (continuous) treatment rather than being treated only when symptoms occur. Treatment may include:  · Psychotherapy. Some forms of talk therapy, such as cognitive-behavioral therapy (CBT), can provide support, education, and guidance.  · Coping strategies, such as journaling or relaxation exercises. Relaxation exercises include:  ? Yoga.  ? Meditation.  ? Deep breathing.  · Lifestyle changes, such as:  ? Limiting alcohol and drug use.  ? Exercising regularly.  ? Getting plenty of sleep.  ? Making healthy eating choices.  · Medicine. Medicine can be prescribed by a health care provider who specializes in treating mental disorders (psychiatrist).  ? Medicines called mood stabilizers are usually prescribed.  ? If symptoms occur even while taking a mood stabilizer, other medicines may be added.  A combination of medicine, talk therapy, and coping methods is the best way to treat this condition.  Follow these instructions at home:  Activity  · Return to your normal activities as told by your health care provider.  · Find activities that you enjoy, and make time to do them.  · Exercise regularly as told by your health care provider.  Lifestyle  · Limit alcohol intake to no more than 1 drink a day for nonpregnant women  and 2 drinks a day for men. One drink equals 12 oz of beer, 5 oz of wine, or 1½ oz of hard liquor.  · Follow a set schedule for eating and sleeping.  · Eat a balanced diet that includes fresh fruits and vegetables, whole grains, low-fat dairy, and lean meats.  · Get at least 7-8 hours of sleep each night.  General instructions  · Take over-the-counter and prescription medicines only as told by your health care provider.  · Think about joining a support group. Your health care provider may be able to recommend a support group.  · Talk with your family and loved ones about your treatment goals and how they can help.  · Keep all follow-up visits as told by your health care provider. This is important.  Where to find more information  For more information about bipolar 2 disorder, visit the following websites:  · National [place] on Mental Illness: www.mariah.org  · [place] National [place] of [place] Health: www.nimh.nih.gov  Contact a health care provider if:  · Your symptoms get worse.  · You have side effects from your medicine, and they get worse.  · You have trouble sleeping.  · You have trouble doing daily activities.  · You feel unsafe in your surroundings.  · You are dealing with substance abuse.  Get help right away if:  · You have new symptoms.  · You have thoughts about harming yourself or others.  · You harm yourself.  Summary  · Bipolar 2 disorder is a mental health disorder in which a person has episodes of hypomania and depression.  · Bipolar 2 is best treated through a combination of medicines, talk therapy, and coping strategies.  · Talk with your family and loved ones about your treatment goals and how they can help.  This information is not intended to replace advice given to you by your health care provider. Make sure you discuss any questions you have with your health care provider.  Document Released: 01/23/2018 Document Revised: 11/30/2018 Document Reviewed: 01/23/2018  Elsevier Patient Education ©  2020 [employer]

## 2020-12-02 ENCOUNTER — OFFICE VISIT (OUTPATIENT)
Dept: PSYCHIATRY | Facility: CLINIC | Age: 45
End: 2020-12-02

## 2020-12-02 DIAGNOSIS — Z79.899 OTHER LONG TERM (CURRENT) DRUG THERAPY: ICD-10-CM

## 2020-12-02 DIAGNOSIS — F31.4 BIPOLAR 1 DISORDER, DEPRESSED, SEVERE (HCC): Primary | ICD-10-CM

## 2020-12-02 DIAGNOSIS — F43.10 PTSD (POST-TRAUMATIC STRESS DISORDER): ICD-10-CM

## 2020-12-02 DIAGNOSIS — F41.1 GENERALIZED ANXIETY DISORDER: ICD-10-CM

## 2020-12-02 DIAGNOSIS — F43.12 CHRONIC POSTTRAUMATIC STRESS DISORDER: ICD-10-CM

## 2020-12-02 PROCEDURE — 99213 OFFICE O/P EST LOW 20 MIN: CPT | Performed by: PSYCHIATRY & NEUROLOGY

## 2020-12-02 RX ORDER — DULOXETIN HYDROCHLORIDE 30 MG/1
30 CAPSULE, DELAYED RELEASE ORAL DAILY
Qty: 90 CAPSULE | Refills: 1 | Status: SHIPPED | OUTPATIENT
Start: 2020-12-02 | End: 2021-05-21 | Stop reason: SDUPTHER

## 2020-12-02 RX ORDER — HYDROXYZINE HYDROCHLORIDE 25 MG/1
25 TABLET, FILM COATED ORAL 2 TIMES DAILY PRN
Qty: 180 TABLET | Refills: 1 | Status: SHIPPED | OUTPATIENT
Start: 2020-12-02 | End: 2022-01-03

## 2020-12-02 RX ORDER — VILAZODONE HYDROCHLORIDE 40 MG/1
40 TABLET ORAL DAILY
Qty: 90 TABLET | Refills: 1 | Status: SHIPPED | OUTPATIENT
Start: 2020-12-02 | End: 2021-05-21 | Stop reason: SDUPTHER

## 2020-12-02 RX ORDER — BUPROPION HYDROCHLORIDE 150 MG/1
150 TABLET, EXTENDED RELEASE ORAL 2 TIMES DAILY
Qty: 180 TABLET | Refills: 1 | Status: SHIPPED | OUTPATIENT
Start: 2020-12-02 | End: 2021-05-21 | Stop reason: SDUPTHER

## 2020-12-02 RX ORDER — DIAZEPAM 5 MG/1
5 TABLET ORAL DAILY PRN
Qty: 30 TABLET | Refills: 3 | Status: SHIPPED | OUTPATIENT
Start: 2020-12-02 | End: 2021-05-21 | Stop reason: SDUPTHER

## 2020-12-02 RX ORDER — LURASIDONE HYDROCHLORIDE 40 MG/1
40 TABLET, FILM COATED ORAL DAILY
Qty: 90 TABLET | Refills: 1 | Status: SHIPPED | OUTPATIENT
Start: 2020-12-02 | End: 2021-05-21

## 2020-12-02 NOTE — PATIENT INSTRUCTIONS
Bipolar 1 Disorder  Bipolar 1 disorder is a mental health disorder in which a person has episodes of emotional highs, or sourav, and may also have episodes of lows, or depression. Bipolar 1 disorder is different from other bipolar disorders in that it involves extreme episodes of sourav (manic episodes). These episodes last at least one week or involve symptoms that are so severe that hospitalization is needed to keep the person safe.  What are the causes?  The cause of this condition is not known.  What increases the risk?  The following factors may make you more likely to develop this condition:  · Having a family member with the disorder.  · Having an imbalance of certain chemicals in the brain (neurotransmitters).  · Experiencing stress, such as illness, financial problems, or a death.  · Having certain conditions that affect the brain or spinal cord (neurologic conditions).  · Having had a brain injury (trauma).  What are the signs or symptoms?  Symptoms of sourav include:  · Very high self-esteem or self-confidence.  · Decreased need for sleep.  · Unusual talkativeness. Speech may be very fast.  · Racing thoughts with quick shifts between topics that may or may not be related (flight of ideas).  · Ability to concentrate either greatly improved or decreased.  · Increased purposeful activity, such as work, studies, or social activity.  · Increased agitation. This could be pacing, squirming, fidgeting, or finger and toe tapping.  · Impulsive behavior and poor judgment. This may result in high-risk activities that are sexual, financial, or physical.  Symptoms of depression include:  · Extreme degrees of sadness, uncontrollable crying, hopelessness, worthlessness, or numbness.  · Sleep problems, such as insomnia, waking early, or sleeping too much.  · No longer enjoying things you used to enjoy.  · Isolation. You may often spend time alone.  · Lack of energy or motivation, and moving more slowly than  normal.  · Trouble making decisions.  · Increased appetite or loss of appetite.  · Thoughts of death, or wanting to harm yourself.  Sometimes, you may have a mixed mood. This means having symptoms of sourav and depression at the same time. Stress can often trigger these symptoms.  How is this diagnosed?  This condition may be diagnosed based on:  · Emotional episodes.  · Medical history.  · Use of alcohol, drugs, and prescription medicines. Certain medical conditions and substances can cause symptoms that seem like bipolar disorder. This is called secondary bipolar disorder.  Your health care provider may ask you to take a short test. This helps to understand your symptoms. You may also be asked to see a mental health specialist to follow up on this diagnosis and start treatment.  How is this treated?         This condition is a long-term (chronic) illness. It is often managed with ongoing treatment rather than treatment only when symptoms occur. A combination of treatments is the main approach. Treatment may include:  · Medicine. Medicine can be prescribed by a health care provider who specializes in treating mental health disorders (psychiatrist). Medicines called mood stabilizers are usually prescribed. If symptoms occur during treatment with a mood stabilizer, other medicines may be added.  · Psychotherapy. Some forms of talk therapy, such as cognitive behavioral therapy (CBT) and family therapy, can help with learning to manage bipolar disorder.  · Psychoeducation. This helps you and others understand how this disorder is managed. Include friends and family in educational sessions so they learn how best to support you.  · Methods of managing your condition, such as journaling or relaxation exercises. Relaxation exercises include:  ? Yoga.  ? Meditation.  ? Deep breathing.  · Lifestyle changes, such as:  ? Limiting alcohol and drug use.  ? Exercising regularly.  ? Structuring when you go to bed and get  up.  ? Eating a healthy diet.  · Electroconvulsive therapy (ECT). This is a procedure in which electricity is applied to the brain through the scalp. It may be used in cases of severe bipolar disorder when medicine and psychotherapy work too slowly or do not work.  Follow these instructions at home:  Activity  · Return to your normal activities as told by your health care provider.  · Find activities that you enjoy, and make time to do them.  · Exercise regularly as told by your health care provider.  Lifestyle    · Follow a set schedule for eating and sleeping.  · Eat a healthy diet that includes fresh fruits and vegetables, whole grains, low-fat dairy, and lean meat.  · Get at least 7-8 hours of sleep each night.  · Avoid using products that contain nicotine or tobacco. If you want help quitting, ask your health care provider.  · Do not use drugs.  Alcohol use  · Do not drink alcohol if:  ? Your health care provider tells you not to drink.  ? You are pregnant, may be pregnant, or are planning to become pregnant.  · If you drink alcohol:  ? Limit how much you use to:  § 0-1 drink a day for women.  § 0-2 drinks a day for men.  ? Be aware of how much alcohol is in your drink. In the [place] one drink equals one 12 oz bottle of beer (355 mL), one 5 oz glass of wine (148 mL), or one 1½ oz glass of hard liquor (44 mL).  General instructions  · Take over-the-counter and prescription medicines only as told by your health care provider. You may think about stopping your medicine, but it is very important to take all your medicine as prescribed.  · Consider joining a support group. Your health care provider may be able to recommend one.  · Talk with your family and friends about your treatment goals and how they can help.  · Keep all follow-up visits as told by your health care provider. This is important.  Where to find more information  · National [place] on Mental Illness: www.mariah.org  · National [place] of Mental  Health: www.[facility].gov  Contact a health care provider if:  · Your symptoms get worse, or your loved ones tell you that your symptoms are getting worse.  · You have uncomfortable side effects from your medicine.  · You have trouble sleeping.  · You have trouble doing daily activities.  · You feel unsafe in your surroundings.  · You are self-medicating with alcohol or drugs.  Get help right away if:  · You have new symptoms.  · You have thoughts about harming yourself or others.  · You are considering suicide.  If you ever feel like you may hurt yourself or others, or have thoughts about taking your own life, get help right away. You can go to your nearest emergency department or call:  · Your local emergency services (911 in the [place]  · A suicide crisis helpline, such as the National Suicide Prevention Lifeline at 1-584.656.9198. This is open 24 hours a day.  Summary  · Bipolar 1 disorder is a lifelong mental health disorder in which a person has episodes of sourav and depression.  · This disorder is mainly treated with a combination of medicines, talk and behavioral therapies, and, often, electroconvulsive therapy (ECT).  · Include friends and family in educational sessions so they know how best to support you.  · Get help right away if you are considering suicide.  This information is not intended to replace advice given to you by your health care provider. Make sure you discuss any questions you have with your health care provider.  Document Revised: 06/02/2020 Document Reviewed: 06/02/2020  [employer]vier Patient Education © 2020 [employer]

## 2021-05-21 ENCOUNTER — OFFICE VISIT (OUTPATIENT)
Dept: PSYCHIATRY | Facility: CLINIC | Age: 46
End: 2021-05-21

## 2021-05-21 DIAGNOSIS — F43.12 CHRONIC POSTTRAUMATIC STRESS DISORDER: Chronic | ICD-10-CM

## 2021-05-21 DIAGNOSIS — F41.1 GENERALIZED ANXIETY DISORDER: Chronic | ICD-10-CM

## 2021-05-21 DIAGNOSIS — F31.4 BIPOLAR 1 DISORDER, DEPRESSED, SEVERE (HCC): Chronic | ICD-10-CM

## 2021-05-21 PROCEDURE — 99214 OFFICE O/P EST MOD 30 MIN: CPT | Performed by: PSYCHIATRY & NEUROLOGY

## 2021-05-21 RX ORDER — VILAZODONE HYDROCHLORIDE 40 MG/1
40 TABLET ORAL DAILY
Qty: 90 TABLET | Refills: 1 | Status: SHIPPED | OUTPATIENT
Start: 2021-05-21 | End: 2021-09-21 | Stop reason: SDUPTHER

## 2021-05-21 RX ORDER — BUPROPION HYDROCHLORIDE 150 MG/1
150 TABLET, EXTENDED RELEASE ORAL EVERY MORNING
Qty: 90 TABLET | Refills: 0
Start: 2021-05-21 | End: 2021-09-21 | Stop reason: SDUPTHER

## 2021-05-21 RX ORDER — LURASIDONE HYDROCHLORIDE 60 MG/1
60 TABLET, FILM COATED ORAL DAILY
Qty: 30 TABLET | Refills: 2 | Status: SHIPPED | OUTPATIENT
Start: 2021-05-21 | End: 2021-08-25

## 2021-05-21 RX ORDER — DULOXETIN HYDROCHLORIDE 30 MG/1
30 CAPSULE, DELAYED RELEASE ORAL DAILY
Qty: 90 CAPSULE | Refills: 1 | Status: SHIPPED | OUTPATIENT
Start: 2021-05-21 | End: 2021-09-21 | Stop reason: SDUPTHER

## 2021-05-21 RX ORDER — DIAZEPAM 5 MG/1
5 TABLET ORAL DAILY PRN
Qty: 30 TABLET | Refills: 3 | Status: SHIPPED | OUTPATIENT
Start: 2021-05-21 | End: 2021-09-21 | Stop reason: SDUPTHER

## 2021-05-21 NOTE — PATIENT INSTRUCTIONS
Bipolar 1 Disorder  Bipolar 1 disorder is a mental health disorder in which a person has episodes of emotional highs, or sourav, and may also have episodes of lows, or depression. Bipolar 1 disorder is different from other bipolar disorders in that it involves extreme episodes of sourav (manic episodes). These episodes last at least one week or involve symptoms that are so severe that hospitalization is needed to keep the person safe.  What are the causes?  The cause of this condition is not known.  What increases the risk?  The following factors may make you more likely to develop this condition:  · Having a family member with the disorder.  · Having an imbalance of certain chemicals in the brain (neurotransmitters).  · Experiencing stress, such as illness, financial problems, or a death.  · Having certain conditions that affect the brain or spinal cord (neurologic conditions).  · Having had a brain injury (trauma).  What are the signs or symptoms?  Symptoms of sourav include:  · Very high self-esteem or self-confidence.  · Decreased need for sleep.  · Unusual talkativeness. Speech may be very fast.  · Racing thoughts with quick shifts between topics that may or may not be related (flight of ideas).  · Ability to concentrate either greatly improved or decreased.  · Increased purposeful activity, such as work, studies, or social activity.  · Increased agitation. This could be pacing, squirming, fidgeting, or finger and toe tapping.  · Impulsive behavior and poor judgment. This may result in high-risk activities that are sexual, financial, or physical.  Symptoms of depression include:  · Extreme degrees of sadness, uncontrollable crying, hopelessness, worthlessness, or numbness.  · Sleep problems, such as insomnia, waking early, or sleeping too much.  · No longer enjoying things you used to enjoy.  · Isolation. You may often spend time alone.  · Lack of energy or motivation, and moving more slowly than  normal.  · Trouble making decisions.  · Increased appetite or loss of appetite.  · Thoughts of death, or wanting to harm yourself.  Sometimes, you may have a mixed mood. This means having symptoms of sourav and depression at the same time. Stress can often trigger these symptoms.  How is this diagnosed?  This condition may be diagnosed based on:  · Emotional episodes.  · Medical history.  · Use of alcohol, drugs, and prescription medicines. Certain medical conditions and substances can cause symptoms that seem like bipolar disorder. This is called secondary bipolar disorder.  Your health care provider may ask you to take a short test. This helps to understand your symptoms. You may also be asked to see a mental health specialist to follow up on this diagnosis and start treatment.  How is this treated?         This condition is a long-term (chronic) illness. It is often managed with ongoing treatment rather than treatment only when symptoms occur. A combination of treatments is the main approach. Treatment may include:  · Medicine. Medicine can be prescribed by a health care provider who specializes in treating mental health disorders (psychiatrist). Medicines called mood stabilizers are usually prescribed. If symptoms occur during treatment with a mood stabilizer, other medicines may be added.  · Psychotherapy. Some forms of talk therapy, such as cognitive behavioral therapy (CBT) and family therapy, can help with learning to manage bipolar disorder.  · Psychoeducation. This helps you and others understand how this disorder is managed. Include friends and family in educational sessions so they learn how best to support you.  · Methods of managing your condition, such as journaling or relaxation exercises. Relaxation exercises include:  ? Yoga.  ? Meditation.  ? Deep breathing.  · Lifestyle changes, such as:  ? Limiting alcohol and drug use.  ? Exercising regularly.  ? Structuring when you go to bed and get  up.  ? Eating a healthy diet.  · Electroconvulsive therapy (ECT). This is a procedure in which electricity is applied to the brain through the scalp. It may be used in cases of severe bipolar disorder when medicine and psychotherapy work too slowly or do not work.  Follow these instructions at home:  Activity  · Return to your normal activities as told by your health care provider.  · Find activities that you enjoy, and make time to do them.  · Exercise regularly as told by your health care provider.  Lifestyle    · Follow a set schedule for eating and sleeping.  · Eat a healthy diet that includes fresh fruits and vegetables, whole grains, low-fat dairy, and lean meat.  · Get at least 7-8 hours of sleep each night.  · Avoid using products that contain nicotine or tobacco. If you want help quitting, ask your health care provider.  · Do not use drugs.  Alcohol use  · Do not drink alcohol if:  ? Your health care provider tells you not to drink.  ? You are pregnant, may be pregnant, or are planning to become pregnant.  · If you drink alcohol:  ? Limit how much you use to:  § 0-1 drink a day for women.  § 0-2 drinks a day for men.  ? Be aware of how much alcohol is in your drink. In the [place] one drink equals one 12 oz bottle of beer (355 mL), one 5 oz glass of wine (148 mL), or one 1½ oz glass of hard liquor (44 mL).  General instructions  · Take over-the-counter and prescription medicines only as told by your health care provider. You may think about stopping your medicine, but it is very important to take all your medicine as prescribed.  · Consider joining a support group. Your health care provider may be able to recommend one.  · Talk with your family and friends about your treatment goals and how they can help.  · Keep all follow-up visits as told by your health care provider. This is important.  Where to find more information  · National [place] on Mental Illness: www.mariah.org  · National [place] of Mental  Health: www.[facility].gov  Contact a health care provider if:  · Your symptoms get worse, or your loved ones tell you that your symptoms are getting worse.  · You have uncomfortable side effects from your medicine.  · You have trouble sleeping.  · You have trouble doing daily activities.  · You feel unsafe in your surroundings.  · You are self-medicating with alcohol or drugs.  Get help right away if:  · You have new symptoms.  · You have thoughts about harming yourself or others.  · You are considering suicide.  If you ever feel like you may hurt yourself or others, or have thoughts about taking your own life, get help right away. You can go to your nearest emergency department or call:  · Your local emergency services (911 in the [place]  · A suicide crisis helpline, such as the National Suicide Prevention Lifeline at 1-280.447.4583. This is open 24 hours a day.  Summary  · Bipolar 1 disorder is a lifelong mental health disorder in which a person has episodes of sourav and depression.  · This disorder is mainly treated with a combination of medicines, talk and behavioral therapies, and, often, electroconvulsive therapy (ECT).  · Include friends and family in educational sessions so they know how best to support you.  · Get help right away if you are considering suicide.  This information is not intended to replace advice given to you by your health care provider. Make sure you discuss any questions you have with your health care provider.  Document Revised: 06/02/2020 Document Reviewed: 06/02/2020  [employer] Patient Education © 2021 [employer]

## 2021-05-21 NOTE — PROGRESS NOTES
"Subjective   Rachelle Hummel is a 45 y.o. female who presents today for follow up     Chief Complaint:  \"NO highs, no lows, just flat\"     History of Present Illness: the pt suffered from depression for many years,   Was on different meds, was stable recently on latuda, cymbalta, wellbutrin, viibryd.  Meds were prescribed by PCP.    Today the pt reported feeling flat, no emotions, no motivations, she had covid few months ago, now still fatigued , does not feel low or high anymore   Anxiety - increased, persistent and intense with frequent panic attacks , a lot of negative thoughts and cognitive errors     She worries about different things, unable to relax, denied AVH/SI/HI     Depression 6-7/10, denied feeling hopeless/helpless,     Triggers - negative news, situation at work   Alleviatign factors - to read , to spend time outdoor   Last manic episode - 3-4 years ago   Denied alc use  THC -- for pain management, recommended by PCP     The following portions of the patient's history were reviewed and updated as appropriate: allergies, current medications, past family history, past medical history, past social history, past surgical history and problem list.    PAST PSYCHIATRIC HISTORY  Axis I  Affective/Bipolar Disorder, Anxiety/Panic Disorder, Posttraumatic Stress  inpt in [place] 2 times,   Axis II  Defer     PAST OUTPATIENT TREATMENT  Diagnosis treated:  Affective Disorder, Anxiety/Panic Disorder, Post-Traumatic Stress  [facility] in the past,  Treatment Type:  Psychotherapy (EMDR) , Medication Management  Prior Psychiatric Medications:  zoloft -  \"better than nothing but did not address all problems\"   Elavil - not effective   Valium, Xanax  Effective   Buspar  - not effective   hydroxyzine  Support Groups:  Not now   Sequelae Of Mental Disorder:  social isolation, emotional distress          Interval History  No changes     Side Effects  Denied       Past Medical History:  Past Medical History:   Diagnosis Date   • " Ankylosing spondylitis of site in spine (CMS/HCC)    • Anorexia nervosa    • Arthritis of back    • Autism spectrum disorder    • Bursitis of hip    • Crohn disease (CMS/[facility])    • Depression    • Hip arthrosis    • HTN (hypertension)    • MS (multiple sclerosis) (CMS/[facility])    • Obesity    • PTSD (post-traumatic stress disorder)    • Rheumatoid arthritis (CMS/[facility])    • Vitamin D deficiency        Social History:  Social History     Socioeconomic History   • Marital status:      Spouse name: Juan Cline   • Number of children: Not on file   • Years of education: Not on file   • Highest education level: Not on file   Tobacco Use   • Smoking status: Current Every Day Smoker     Packs/day: 0.25   • Smokeless tobacco: Never Used   • Tobacco comment: cape   Substance and Sexual Activity   • Alcohol use: Yes     Comment: occasionally   • Drug use: Yes     Types: Marijuana     Comment: for pain management    • Sexual activity: Not Currently       Family History:  Family History   Problem Relation Age of Onset   • Diabetes Mother    • Hypertension Mother    • Atrial fibrillation Mother    • Crohn's disease Father    • Heart disease Father        Past Surgical History:  Past Surgical History:   Procedure Laterality Date   • CHOLECYSTECTOMY     • COLON RESECTION         Problem List:  Patient Active Problem List   Diagnosis   • Allergy-induced asthma, mild intermittent, uncomplicated   • Ankylosing spondylitis (CMS/[facility])   • Back pain   • Crohn's disease (CMS/[facility])   • Current every day smoker   • Fatigue   • Hypertension   • Inflammatory arthritis   • Rheumatoid arthritis (CMS/[facility])   • Long term current use of systemic steroids   • Multiple sclerosis (CMS/HCC)   • Immunosuppressive disease (CMS/[facility])   • Obesity   • Other long term (current) drug therapy   • Pain of right hip joint   • Radiculopathy   • Seasonal allergies   • Trochanteric bursitis of right hip   • Urinary tract infection   • Vitamin D deficiency   •  Bipolar 1 disorder, depressed, severe (CMS/HCC)   • PTSD (post-traumatic stress disorder)       Allergy:   Allergies   Allergen Reactions   • Sulfa Antibiotics Hives        Discontinued Medications:  Medications Discontinued During This Encounter   Medication Reason   • vilazodone (Viibryd) 40 MG tablet tablet Reorder   • lurasidone (Latuda) 40 MG tablet tablet    • DULoxetine (CYMBALTA) 30 MG capsule Reorder   • buPROPion SR (WELLBUTRIN SR) 150 MG 12 hr tablet Reorder   • diazePAM (Valium) 5 MG tablet Reorder       Current Medications:   Current Outpatient Medications   Medication Sig Dispense Refill   • acetaminophen (TYLENOL 8 HOUR ARTHRITIS PAIN) 650 MG 8 hr tablet Every 6 (Six) Hours.     • albuterol sulfate HFA (VENTOLIN HFA) 108 (90 Base) MCG/ACT inhaler VENTOLIN  (90 Base) MCG/ACT AERS     • buPROPion SR (WELLBUTRIN SR) 150 MG 12 hr tablet Take 1 tablet by mouth Every Morning. 90 tablet 0   • cephalexin (KEFLEX) 500 MG capsule      • chlorpheniramine (CHLOR-TRIMETON) 4 MG tablet Take 4 mg by mouth.     • colestipol (COLESTID) 1 g tablet Take 1 g by mouth 2 (Two) Times a Day.     • cyclobenzaprine (FLEXERIL) 10 MG tablet Every 12 (Twelve) Hours.     • diazePAM (Valium) 5 MG tablet Take 1 tablet by mouth Daily As Needed for Anxiety. 30 tablet 3   • diphenhydrAMINE (BENADRYL ALLERGY) 25 MG tablet Every 12 (Twelve) Hours.     • DULoxetine (CYMBALTA) 30 MG capsule Take 1 capsule by mouth Daily. 90 capsule 1   • fluconazole (DIFLUCAN) 150 MG tablet      • gabapentin (NEURONTIN) 600 MG tablet Take 600 mg by mouth 3 (Three) Times a Day.     • HUMIRA 40 MG/0.4ML Prefilled Syringe Kit injection      • hydrOXYzine (ATARAX) 25 MG tablet Take 1 tablet by mouth 2 (Two) Times a Day As Needed for Anxiety. 180 tablet 1   • leflunomide (ARAVA) 20 MG tablet TAKE ONE TABLET BY MOUTH DAILY 90 tablet 0   • lurasidone HCl (Latuda) 60 MG tablet tablet Take 1 tablet by mouth Daily. 30 tablet 2   • mesalamine (LIALDA) 1.2 g EC  tablet Every 12 (Twelve) Hours.     • mupirocin (BACTROBAN) 2 % ointment      • ondansetron ODT (ZOFRAN-ODT) 4 MG disintegrating tablet      • pantoprazole (PROTONIX) 40 MG EC tablet      • promethazine (PHENERGAN) 25 MG tablet Every 6 (Six) Hours.     • vilazodone (Viibryd) 40 MG tablet tablet Take 1 tablet by mouth Daily. 90 tablet 1   • vitamin D (ERGOCALCIFEROL) 1.25 MG (22649 UT) capsule capsule Take 1 capsule by mouth 1 (One) Time Per Week. 12 capsule 0     No current facility-administered medications for this visit.         Review of Symptoms:    Psychiatric/Behavioral: Negative for agitation, behavioral problems, confusion, decreased concentration, dysphoric mood, hallucinations, self-injury, sleep disturbance and suicidal ideas. The patient is  Still nervous/anxious and is not hyperactive.        Physical Exam:   There were no vitals taken for this visit.    Mental Status Exam:   Hygiene:   good  Cooperation:  Cooperative  Eye Contact:  Good  Psychomotor Behavior:  Appropriate  Affect:  Restricted  Mood: depressed  Hopelessness: Denies  Speech:  Normal  Thought Process:  Goal directed and Linear  Thought Content:  Normal  Suicidal:  None  Homicidal:  None  Hallucinations:  None  Delusion:  None  Memory:  fair - decreased short term memory   Orientation:  Person, Place, Time and Situation  Reliability:  good  Insight:  Good  Judgement:  Good  Impulse Control:  Good  Physical/Medical Issues:  Yes       MSE from 12/2/2020 reviewed and accepted with changes     PHQ-9 Depression Screening  Little interest or pleasure in doing things? 2   Feeling down, depressed, or hopeless? 2   Trouble falling or staying asleep, or sleeping too much? 2   Feeling tired or having little energy? 2   Poor appetite or overeating? 1   Feeling bad about yourself - or that you are a failure or have let yourself or your family down? 2   Trouble concentrating on things, such as reading the newspaper or watching television? 2   Moving or  speaking so slowly that other people could have noticed? Or the opposite - being so fidgety or restless that you have been moving around a lot more than usual? 0   Thoughts that you would be better off dead, or of hurting yourself in some way? 0   PHQ-9 Total Score 13   If you checked off any problems, how difficult have these problems made it for you to do your work, take care of things at home, or get along with other people? Very difficult           Current every day smoker 3-10 mintues spent counseling Has reduced [place] use    I advised Rachelle of the risks of tobacco use.     Lab Results:   No visits with results within 3 Month(s) from this visit.   Latest known visit with results is:   Lab on 01/21/2020   Component Date Value Ref Range Status   • Glucose 01/21/2020 99  65 - 99 mg/dL Final   • BUN 01/21/2020 7  6 - 20 mg/dL Final   • Creatinine 01/21/2020 0.83  0.57 - 1.00 mg/dL Final   • Sodium 01/21/2020 136  136 - 145 mmol/L Final   • Potassium 01/21/2020 4.1  3.5 - 5.2 mmol/L Final   • Chloride 01/21/2020 99  98 - 107 mmol/L Final   • CO2 01/21/2020 23.1  22.0 - 29.0 mmol/L Final   • Calcium 01/21/2020 9.8  8.6 - 10.5 mg/dL Final   • Total Protein 01/21/2020 8.3  6.0 - 8.5 g/dL Final   • Albumin 01/21/2020 4.60  3.50 - 5.20 g/dL Final   • ALT (SGPT) 01/21/2020 15  1 - 33 U/L Final   • AST (SGOT) 01/21/2020 15  1 - 32 U/L Final   • Alkaline Phosphatase 01/21/2020 66  39 - 117 U/L Final   • Total Bilirubin 01/21/2020 0.3  0.2 - 1.2 mg/dL Final   • eGFR Non African Amer 01/21/2020 75  >60 mL/min/1.73 Final   • Globulin 01/21/2020 3.7  gm/dL Final   • A/G Ratio 01/21/2020 1.2  g/dL Final   • BUN/Creatinine Ratio 01/21/2020 8.4  7.0 - 25.0 Final   • Anion Gap 01/21/2020 13.9  5.0 - 15.0 mmol/L Final   • C-Reactive Protein 01/21/2020 1.02* 0.00 - 0.50 mg/dL Final   • CCP Antibodies IgG/IgA 01/21/2020 14  0 - 19 units Final                              Negative               <20                            Weak  positive      20 - 39                            Moderate positive  40 - 59                            Strong positive        >59   • Rheumatoid Factor Quantitative 01/21/2020 <10.0  0.0 - 14.0 IU/mL Final   • Sed Rate 01/21/2020 32* 0 - 20 mm/hr Final   • 25 Hydroxy, Vitamin D 01/21/2020 15.7* 30.0 - 100.0 ng/ml Final   • WBC 01/21/2020 7.65  3.40 - 10.80 10*3/mm3 Final   • RBC 01/21/2020 4.33  3.77 - 5.28 10*6/mm3 Final   • Hemoglobin 01/21/2020 13.4  12.0 - 15.9 g/dL Final   • Hematocrit 01/21/2020 38.9  34.0 - 46.6 % Final   • MCV 01/21/2020 89.8  79.0 - 97.0 fL Final   • MCH 01/21/2020 30.9  26.6 - 33.0 pg Final   • MCHC 01/21/2020 34.4  31.5 - 35.7 g/dL Final   • RDW 01/21/2020 14.3  12.3 - 15.4 % Final   • RDW-SD 01/21/2020 47.2  37.0 - 54.0 fl Final   • MPV 01/21/2020 9.4  6.0 - 12.0 fL Final   • Platelets 01/21/2020 347  140 - 450 10*3/mm3 Final   • Neutrophil % 01/21/2020 64.1  42.7 - 76.0 % Final   • Lymphocyte % 01/21/2020 23.4  19.6 - 45.3 % Final   • Monocyte % 01/21/2020 7.1  5.0 - 12.0 % Final   • Eosinophil % 01/21/2020 3.7  0.3 - 6.2 % Final   • Basophil % 01/21/2020 1.4  0.0 - 1.5 % Final   • Immature Grans % 01/21/2020 0.3  0.0 - 0.5 % Final   • Neutrophils, Absolute 01/21/2020 4.91  1.70 - 7.00 10*3/mm3 Final   • Lymphocytes, Absolute 01/21/2020 1.79  0.70 - 3.10 10*3/mm3 Final   • Monocytes, Absolute 01/21/2020 0.54  0.10 - 0.90 10*3/mm3 Final   • Eosinophils, Absolute 01/21/2020 0.28  0.00 - 0.40 10*3/mm3 Final   • Basophils, Absolute 01/21/2020 0.11  0.00 - 0.20 10*3/mm3 Final   • Immature Grans, Absolute 01/21/2020 0.02  0.00 - 0.05 10*3/mm3 Final   • nRBC 01/21/2020 0.0  0.0 - 0.2 /100 WBC Final       Assessment/Plan   Problems Addressed this Visit        Mental Health    Bipolar 1 disorder, depressed, severe (CMS/[facility]) (Chronic)    Relevant Medications    buPROPion SR (WELLBUTRIN SR) 150 MG 12 hr tablet    lurasidone HCl (Latuda) 60 MG tablet tablet    DULoxetine (CYMBALTA) 30 MG capsule     vilazodone (Viibryd) 40 MG tablet tablet    diazePAM (Valium) 5 MG tablet      Other Visit Diagnoses     Chronic posttraumatic stress disorder  (Chronic)       Relevant Medications    buPROPion SR (WELLBUTRIN SR) 150 MG 12 hr tablet    lurasidone HCl (Latuda) 60 MG tablet tablet    DULoxetine (CYMBALTA) 30 MG capsule    vilazodone (Viibryd) 40 MG tablet tablet    diazePAM (Valium) 5 MG tablet    Generalized anxiety disorder  (Chronic)       Relevant Medications    buPROPion SR (WELLBUTRIN SR) 150 MG 12 hr tablet    lurasidone HCl (Latuda) 60 MG tablet tablet    DULoxetine (CYMBALTA) 30 MG capsule    vilazodone (Viibryd) 40 MG tablet tablet    diazePAM (Valium) 5 MG tablet      Diagnoses       Codes Comments    Bipolar 1 disorder, depressed, severe (CMS/HCC)     ICD-10-CM: F31.4  ICD-9-CM: 296.53     Chronic posttraumatic stress disorder     ICD-10-CM: F43.12  ICD-9-CM: 309.81     Generalized anxiety disorder     ICD-10-CM: F41.1  ICD-9-CM: 300.02           Visit Diagnoses:    ICD-10-CM ICD-9-CM   1. Bipolar 1 disorder, depressed, severe (CMS/HCC)  F31.4 296.53   2. Chronic posttraumatic stress disorder  F43.12 309.81   3. Generalized anxiety disorder  F41.1 300.02       TREATMENT PLAN/GOALS: Continue supportive psychotherapy efforts and medications as indicated. Treatment and medication options discussed during today's visit. Patient ackowledged and verbally consented to continue with current treatment plan and was educated on the importance of compliance with treatment and follow-up appointments.    MEDICATION ISSUES:  1. Bipolar d/o depressed moderate - increase latuda to 60 mg The pt was stable on current meds,   Cont  viibryd,  Decrease Wellbutrin to 150 mg , Valium 5 mg QD PRN for anxiety   Cont cymbalta 30 mg   2. PTSD - cont cymbalta and viibryd   3. MIREILLE - viibryd and cymbalta, valium     CBT book was suggested and negative schemas discussed   The pt had psychtherapy including EMDR - did not respond well    Referred to [facility]   INSPECT reviewed as expected - gabapentin , valium - 3/31/2021  last refill   UDS + benzo (consistent) and + THC >300   PHQ scored 13 nad indicated moderate depression     Discussed medication options and treatment plan of prescribed medication as well as the risks, benefits, and side effects including potential falls, possible impaired driving and metabolic adversities among others. Patient is agreeable to call the office with any worsening of symptoms or onset of side effects. Patient is agreeable to call 911 or go to the nearest ER should he/she begin having SI/HI. No medication side effects or related complaints today.     MEDS ORDERED DURING VISIT:  New Medications Ordered This Visit   Medications   • buPROPion SR (WELLBUTRIN SR) 150 MG 12 hr tablet     Sig: Take 1 tablet by mouth Every Morning.     Dispense:  90 tablet     Refill:  0   • lurasidone HCl (Latuda) 60 MG tablet tablet     Sig: Take 1 tablet by mouth Daily.     Dispense:  30 tablet     Refill:  2   • DULoxetine (CYMBALTA) 30 MG capsule     Sig: Take 1 capsule by mouth Daily.     Dispense:  90 capsule     Refill:  1   • vilazodone (Viibryd) 40 MG tablet tablet     Sig: Take 1 tablet by mouth Daily.     Dispense:  90 tablet     Refill:  1   • diazePAM (Valium) 5 MG tablet     Sig: Take 1 tablet by mouth Daily As Needed for Anxiety.     Dispense:  30 tablet     Refill:  3       Return in about 4 months (around 9/21/2021).          This document has been electronically signed by Roxann Johnson MD  May 21, 2021 09:46 EDT

## 2021-08-25 DIAGNOSIS — F31.4 BIPOLAR 1 DISORDER, DEPRESSED, SEVERE (HCC): Chronic | ICD-10-CM

## 2021-08-25 RX ORDER — LURASIDONE HYDROCHLORIDE 60 MG/1
TABLET, FILM COATED ORAL
Qty: 30 TABLET | Refills: 0 | Status: SHIPPED | OUTPATIENT
Start: 2021-08-25 | End: 2021-09-21 | Stop reason: SDUPTHER

## 2021-09-21 ENCOUNTER — OFFICE VISIT (OUTPATIENT)
Dept: PSYCHIATRY | Facility: CLINIC | Age: 46
End: 2021-09-21

## 2021-09-21 DIAGNOSIS — F41.1 GENERALIZED ANXIETY DISORDER: Chronic | ICD-10-CM

## 2021-09-21 DIAGNOSIS — F31.4 BIPOLAR 1 DISORDER, DEPRESSED, SEVERE (HCC): Primary | Chronic | ICD-10-CM

## 2021-09-21 DIAGNOSIS — Z79.899 OTHER LONG TERM (CURRENT) DRUG THERAPY: ICD-10-CM

## 2021-09-21 DIAGNOSIS — F43.10 PTSD (POST-TRAUMATIC STRESS DISORDER): Chronic | ICD-10-CM

## 2021-09-21 PROCEDURE — 99214 OFFICE O/P EST MOD 30 MIN: CPT | Performed by: PSYCHIATRY & NEUROLOGY

## 2021-09-21 RX ORDER — DIAZEPAM 5 MG/1
5 TABLET ORAL DAILY PRN
Qty: 30 TABLET | Refills: 3 | Status: SHIPPED | OUTPATIENT
Start: 2021-09-21 | End: 2022-09-21

## 2021-09-21 RX ORDER — LURASIDONE HYDROCHLORIDE 60 MG/1
60 TABLET, FILM COATED ORAL DAILY
Qty: 30 TABLET | Refills: 3 | Status: SHIPPED | OUTPATIENT
Start: 2021-09-21 | End: 2022-01-29

## 2021-09-21 RX ORDER — DULOXETIN HYDROCHLORIDE 30 MG/1
30 CAPSULE, DELAYED RELEASE ORAL DAILY
Qty: 90 CAPSULE | Refills: 1 | Status: SHIPPED | OUTPATIENT
Start: 2021-09-21 | End: 2022-12-08

## 2021-09-21 RX ORDER — VILAZODONE HYDROCHLORIDE 40 MG/1
40 TABLET ORAL DAILY
Qty: 90 TABLET | Refills: 1 | Status: SHIPPED | OUTPATIENT
Start: 2021-09-21 | End: 2022-12-08

## 2021-09-21 RX ORDER — BUPROPION HYDROCHLORIDE 150 MG/1
150 TABLET, EXTENDED RELEASE ORAL EVERY MORNING
Qty: 90 TABLET | Refills: 0 | Status: SHIPPED | OUTPATIENT
Start: 2021-09-21 | End: 2022-03-01

## 2021-09-21 NOTE — PATIENT INSTRUCTIONS
Bipolar 1 Disorder  Bipolar 1 disorder is a mental health disorder in which a person has episodes of emotional highs, or sourav, and may also have episodes of lows, or depression. Bipolar 1 disorder is different from other bipolar disorders in that it involves extreme episodes of sourav (manic episodes). These episodes last at least one week or involve symptoms that are so severe that hospitalization is needed to keep the person safe.  What are the causes?  The cause of this condition is not known.  What increases the risk?  The following factors may make you more likely to develop this condition:  · Having a family member with the disorder.  · Having an imbalance of certain chemicals in the brain (neurotransmitters).  · Experiencing stress, such as illness, financial problems, or a death.  · Having certain conditions that affect the brain or spinal cord (neurologic conditions).  · Having had a brain injury (trauma).  What are the signs or symptoms?  Symptoms of sourav include:  · Very high self-esteem or self-confidence.  · Decreased need for sleep.  · Unusual talkativeness. Speech may be very fast.  · Racing thoughts with quick shifts between topics that may or may not be related (flight of ideas).  · Ability to concentrate either greatly improved or decreased.  · Increased purposeful activity, such as work, studies, or social activity.  · Increased agitation. This could be pacing, squirming, fidgeting, or finger and toe tapping.  · Impulsive behavior and poor judgment. This may result in high-risk activities that are sexual, financial, or physical.  Symptoms of depression include:  · Extreme degrees of sadness, uncontrollable crying, hopelessness, worthlessness, or numbness.  · Sleep problems, such as insomnia, waking early, or sleeping too much.  · No longer enjoying things you used to enjoy.  · Isolation. You may often spend time alone.  · Lack of energy or motivation, and moving more slowly than  normal.  · Trouble making decisions.  · Increased appetite or loss of appetite.  · Thoughts of death, or wanting to harm yourself.  Sometimes, you may have a mixed mood. This means having symptoms of sourav and depression at the same time. Stress can often trigger these symptoms.  How is this diagnosed?  This condition may be diagnosed based on:  · Emotional episodes.  · Medical history.  · Use of alcohol, drugs, and prescription medicines. Certain medical conditions and substances can cause symptoms that seem like bipolar disorder. This is called secondary bipolar disorder.  Your health care provider may ask you to take a short test. This helps to understand your symptoms. You may also be asked to see a mental health specialist to follow up on this diagnosis and start treatment.  How is this treated?         This condition is a long-term (chronic) illness. It is often managed with ongoing treatment rather than treatment only when symptoms occur. A combination of treatments is the main approach. Treatment may include:  · Medicine. Medicine can be prescribed by a health care provider who specializes in treating mental health disorders (psychiatrist). Medicines called mood stabilizers are usually prescribed. If symptoms occur during treatment with a mood stabilizer, other medicines may be added.  · Psychotherapy. Some forms of talk therapy, such as cognitive behavioral therapy (CBT) and family therapy, can help with learning to manage bipolar disorder.  · Psychoeducation. This helps you and others understand how this disorder is managed. Include friends and family in educational sessions so they learn how best to support you.  · Methods of managing your condition, such as journaling or relaxation exercises. Relaxation exercises include:  ? Yoga.  ? Meditation.  ? Deep breathing.  · Lifestyle changes, such as:  ? Limiting alcohol and drug use.  ? Exercising regularly.  ? Structuring when you go to bed and get  up.  ? Eating a healthy diet.  · Electroconvulsive therapy (ECT). This is a procedure in which electricity is applied to the brain through the scalp. It may be used in cases of severe bipolar disorder when medicine and psychotherapy work too slowly or do not work.  Follow these instructions at home:  Activity  · Return to your normal activities as told by your health care provider.  · Find activities that you enjoy, and make time to do them.  · Exercise regularly as told by your health care provider.  Lifestyle    · Follow a set schedule for eating and sleeping.  · Eat a healthy diet that includes fresh fruits and vegetables, whole grains, low-fat dairy, and lean meat.  · Get at least 7-8 hours of sleep each night.  · Avoid using products that contain nicotine or tobacco. If you want help quitting, ask your health care provider.  · Do not use drugs.    Alcohol use  · Do not drink alcohol if:  ? Your health care provider tells you not to drink.  ? You are pregnant, may be pregnant, or are planning to become pregnant.  · If you drink alcohol:  ? Limit how much you use to:  § 0-1 drink a day for women.  § 0-2 drinks a day for men.  ? Be aware of how much alcohol is in your drink. In the [place] one drink equals one 12 oz bottle of beer (355 mL), one 5 oz glass of wine (148 mL), or one 1½ oz glass of hard liquor (44 mL).  General instructions  · Take over-the-counter and prescription medicines only as told by your health care provider. You may think about stopping your medicine, but it is very important to take all your medicine as prescribed.  · Consider joining a support group. Your health care provider may be able to recommend one.  · Talk with your family and friends about your treatment goals and how they can help.  · Keep all follow-up visits as told by your health care provider. This is important.  Where to find more information  · National [place] on Mental Illness: www.[facility].org  · National [place] of Mental  Health: www.[facility].gov  Contact a health care provider if:  · Your symptoms get worse, or your loved ones tell you that your symptoms are getting worse.  · You have uncomfortable side effects from your medicine.  · You have trouble sleeping.  · You have trouble doing daily activities.  · You feel unsafe in your surroundings.  · You are self-medicating with alcohol or drugs.  Get help right away if:  · You have new symptoms.  · You have thoughts about harming yourself or others.  · You are considering suicide.  If you ever feel like you may hurt yourself or others, or have thoughts about taking your own life, get help right away. You can go to your nearest emergency department or call:  · Your local emergency services (911 in the [place]  · A suicide crisis helpline, such as the National Suicide Prevention Lifeline at 1-711.144.1053. This is open 24 hours a day.  Summary  · Bipolar 1 disorder is a lifelong mental health disorder in which a person has episodes of sourav and depression.  · This disorder is mainly treated with a combination of medicines, talk and behavioral therapies, and, often, electroconvulsive therapy (ECT).  · Include friends and family in educational sessions so they know how best to support you.  · Get help right away if you are considering suicide.  This information is not intended to replace advice given to you by your health care provider. Make sure you discuss any questions you have with your health care provider.  Document Revised: 06/02/2020 Document Reviewed: 06/02/2020  [employer] Patient Education © 2021 [employer]

## 2022-01-03 RX ORDER — HYDROXYZINE HYDROCHLORIDE 25 MG/1
25 TABLET, FILM COATED ORAL 2 TIMES DAILY PRN
Qty: 60 TABLET | Refills: 5 | Status: SHIPPED | OUTPATIENT
Start: 2022-01-03

## 2022-01-29 DIAGNOSIS — F31.4 BIPOLAR 1 DISORDER, DEPRESSED, SEVERE: Chronic | ICD-10-CM

## 2022-01-29 RX ORDER — LURASIDONE HYDROCHLORIDE 60 MG/1
TABLET, FILM COATED ORAL
Qty: 30 TABLET | Refills: 3 | Status: SHIPPED | OUTPATIENT
Start: 2022-01-29 | End: 2022-12-08

## 2022-03-01 RX ORDER — BUPROPION HYDROCHLORIDE 150 MG/1
TABLET, EXTENDED RELEASE ORAL
Qty: 90 TABLET | Refills: 0 | Status: SHIPPED | OUTPATIENT
Start: 2022-03-01 | End: 2022-12-08

## 2022-06-07 RX ORDER — BUPROPION HYDROCHLORIDE 150 MG/1
TABLET, EXTENDED RELEASE ORAL
Qty: 90 TABLET | Refills: 0 | OUTPATIENT
Start: 2022-06-07

## 2022-09-12 ENCOUNTER — TELEPHONE (OUTPATIENT)
Dept: PEDIATRICS | Facility: OTHER | Age: 47
End: 2022-09-12

## 2022-09-13 ENCOUNTER — OFFICE VISIT (OUTPATIENT)
Dept: FAMILY MEDICINE CLINIC | Facility: CLINIC | Age: 47
End: 2022-09-13

## 2022-09-13 VITALS — HEIGHT: 61 IN | BODY MASS INDEX: 37.76 KG/M2 | WEIGHT: 200 LBS

## 2022-09-13 DIAGNOSIS — J34.89 SINUS DRAINAGE: ICD-10-CM

## 2022-09-13 DIAGNOSIS — R05.9 COUGH: ICD-10-CM

## 2022-09-13 DIAGNOSIS — U07.1 ACUTE COVID-19: Primary | ICD-10-CM

## 2022-09-13 LAB
EXPIRATION DATE: ABNORMAL
FLUAV AG UPPER RESP QL IA.RAPID: NOT DETECTED
FLUBV AG UPPER RESP QL IA.RAPID: NOT DETECTED
INTERNAL CONTROL: ABNORMAL
Lab: ABNORMAL
SARS-COV-2 AG UPPER RESP QL IA.RAPID: DETECTED

## 2022-09-13 PROCEDURE — 87428 SARSCOV & INF VIR A&B AG IA: CPT | Performed by: REGISTERED NURSE

## 2022-09-13 PROCEDURE — 99214 OFFICE O/P EST MOD 30 MIN: CPT | Performed by: REGISTERED NURSE

## 2022-09-13 PROCEDURE — T1015 CLINIC SERVICE: HCPCS | Performed by: REGISTERED NURSE

## 2022-09-13 RX ORDER — GUAIFENESIN AND DEXTROMETHORPHAN HYDROBROMIDE 600; 30 MG/1; MG/1
1 TABLET, EXTENDED RELEASE ORAL 2 TIMES DAILY PRN
Qty: 60 TABLET | Refills: 1 | Status: SHIPPED | OUTPATIENT
Start: 2022-09-13

## 2022-09-13 RX ORDER — MULTIPLE VITAMINS W/ MINERALS TAB 9MG-400MCG
1 TAB ORAL DAILY
COMMUNITY

## 2022-09-13 RX ORDER — BENZONATATE 100 MG/1
100 CAPSULE ORAL 3 TIMES DAILY PRN
Qty: 30 CAPSULE | Refills: 1 | Status: SHIPPED | OUTPATIENT
Start: 2022-09-13

## 2022-09-13 RX ORDER — DEXTROMETHORPHAN HYDROBROMIDE AND PROMETHAZINE HYDROCHLORIDE 15; 6.25 MG/5ML; MG/5ML
5 SYRUP ORAL 4 TIMES DAILY PRN
Qty: 180 ML | Refills: 1 | Status: SHIPPED | OUTPATIENT
Start: 2022-09-13

## 2022-09-13 RX ORDER — PHENYLEPHRINE HCL 10 MG/1
10 TABLET, FILM COATED ORAL EVERY 4 HOURS PRN
COMMUNITY

## 2022-10-10 ENCOUNTER — TELEPHONE (OUTPATIENT)
Dept: FAMILY MEDICINE CLINIC | Facility: CLINIC | Age: 47
End: 2022-10-10

## 2022-10-10 NOTE — TELEPHONE ENCOUNTER
HUB TO READ BELOW:    Left message on machine to see if we can get patient scheduled for a routine visit. She was first seen in her car due to having Covid. I would love to get her in here over the next few weeks and get some lab work and discuss her current needs.     Thank you.

## 2022-10-24 ENCOUNTER — OFFICE VISIT (OUTPATIENT)
Dept: FAMILY MEDICINE CLINIC | Facility: CLINIC | Age: 47
End: 2022-10-24

## 2022-10-24 VITALS
SYSTOLIC BLOOD PRESSURE: 122 MMHG | RESPIRATION RATE: 18 BRPM | HEIGHT: 72 IN | WEIGHT: 202.2 LBS | HEART RATE: 87 BPM | BODY MASS INDEX: 27.39 KG/M2 | OXYGEN SATURATION: 98 % | DIASTOLIC BLOOD PRESSURE: 83 MMHG | TEMPERATURE: 97.3 F

## 2022-10-24 DIAGNOSIS — Z86.16 HISTORY OF COVID-19: ICD-10-CM

## 2022-10-24 DIAGNOSIS — Z13.0 SCREENING FOR ENDOCRINE, METABOLIC AND IMMUNITY DISORDER: ICD-10-CM

## 2022-10-24 DIAGNOSIS — Z13.220 NEED FOR LIPID SCREENING: ICD-10-CM

## 2022-10-24 DIAGNOSIS — Z23 ENCOUNTER FOR IMMUNIZATION: Primary | ICD-10-CM

## 2022-10-24 DIAGNOSIS — Z13.29 SCREENING FOR ENDOCRINE, METABOLIC AND IMMUNITY DISORDER: ICD-10-CM

## 2022-10-24 DIAGNOSIS — Z13.1 SCREENING FOR DIABETES MELLITUS: ICD-10-CM

## 2022-10-24 DIAGNOSIS — Z13.29 SCREENING FOR THYROID DISORDER: ICD-10-CM

## 2022-10-24 DIAGNOSIS — M05.20 RHEUMATOID ARTERITIS: ICD-10-CM

## 2022-10-24 DIAGNOSIS — Z13.228 SCREENING FOR ENDOCRINE, METABOLIC AND IMMUNITY DISORDER: ICD-10-CM

## 2022-10-24 LAB — HBA1C MFR BLD: 6.2 % (ref 3.5–5.6)

## 2022-10-24 PROCEDURE — 83036 HEMOGLOBIN GLYCOSYLATED A1C: CPT | Performed by: REGISTERED NURSE

## 2022-10-24 PROCEDURE — 80050 GENERAL HEALTH PANEL: CPT | Performed by: REGISTERED NURSE

## 2022-10-24 PROCEDURE — 80061 LIPID PANEL: CPT | Performed by: REGISTERED NURSE

## 2022-10-24 PROCEDURE — 99214 OFFICE O/P EST MOD 30 MIN: CPT | Performed by: REGISTERED NURSE

## 2022-10-24 PROCEDURE — 85652 RBC SED RATE AUTOMATED: CPT | Performed by: REGISTERED NURSE

## 2022-10-24 PROCEDURE — T1015 CLINIC SERVICE: HCPCS | Performed by: REGISTERED NURSE

## 2022-10-24 PROCEDURE — 90686 IIV4 VACC NO PRSV 0.5 ML IM: CPT | Performed by: REGISTERED NURSE

## 2022-10-24 PROCEDURE — 90471 IMMUNIZATION ADMIN: CPT | Performed by: REGISTERED NURSE

## 2022-10-25 LAB
ALBUMIN SERPL-MCNC: 4.2 G/DL (ref 3.5–5.2)
ALBUMIN/GLOB SERPL: 1.4 G/DL
ALP SERPL-CCNC: 63 U/L (ref 39–117)
ALT SERPL W P-5'-P-CCNC: 23 U/L (ref 1–33)
ANION GAP SERPL CALCULATED.3IONS-SCNC: 11.7 MMOL/L (ref 5–15)
AST SERPL-CCNC: 19 U/L (ref 1–32)
BASOPHILS # BLD AUTO: 0.1 10*3/MM3 (ref 0–0.2)
BASOPHILS NFR BLD AUTO: 1.4 % (ref 0–1.5)
BILIRUB SERPL-MCNC: 0.2 MG/DL (ref 0–1.2)
BUN SERPL-MCNC: 6 MG/DL (ref 6–20)
BUN/CREAT SERPL: 8.2 (ref 7–25)
CALCIUM SPEC-SCNC: 9.3 MG/DL (ref 8.6–10.5)
CHLORIDE SERPL-SCNC: 105 MMOL/L (ref 98–107)
CHOLEST SERPL-MCNC: 169 MG/DL (ref 0–200)
CO2 SERPL-SCNC: 23.3 MMOL/L (ref 22–29)
CREAT SERPL-MCNC: 0.73 MG/DL (ref 0.57–1)
DEPRECATED RDW RBC AUTO: 42.4 FL (ref 37–54)
EGFRCR SERPLBLD CKD-EPI 2021: 103.5 ML/MIN/1.73
EOSINOPHIL # BLD AUTO: 0.57 10*3/MM3 (ref 0–0.4)
EOSINOPHIL NFR BLD AUTO: 7.9 % (ref 0.3–6.2)
ERYTHROCYTE [DISTWIDTH] IN BLOOD BY AUTOMATED COUNT: 12.9 % (ref 12.3–15.4)
ERYTHROCYTE [SEDIMENTATION RATE] IN BLOOD: 44 MM/HR (ref 0–20)
GLOBULIN UR ELPH-MCNC: 3 GM/DL
GLUCOSE SERPL-MCNC: 96 MG/DL (ref 65–99)
HCT VFR BLD AUTO: 37.5 % (ref 34–46.6)
HDLC SERPL-MCNC: 50 MG/DL (ref 40–60)
HGB BLD-MCNC: 12.7 G/DL (ref 12–15.9)
IMM GRANULOCYTES # BLD AUTO: 0.02 10*3/MM3 (ref 0–0.05)
IMM GRANULOCYTES NFR BLD AUTO: 0.3 % (ref 0–0.5)
LDLC SERPL CALC-MCNC: 102 MG/DL (ref 0–100)
LDLC/HDLC SERPL: 2.01 {RATIO}
LYMPHOCYTES # BLD AUTO: 1.63 10*3/MM3 (ref 0.7–3.1)
LYMPHOCYTES NFR BLD AUTO: 22.5 % (ref 19.6–45.3)
MCH RBC QN AUTO: 30.8 PG (ref 26.6–33)
MCHC RBC AUTO-ENTMCNC: 33.9 G/DL (ref 31.5–35.7)
MCV RBC AUTO: 91 FL (ref 79–97)
MONOCYTES # BLD AUTO: 0.75 10*3/MM3 (ref 0.1–0.9)
MONOCYTES NFR BLD AUTO: 10.3 % (ref 5–12)
NEUTROPHILS NFR BLD AUTO: 4.19 10*3/MM3 (ref 1.7–7)
NEUTROPHILS NFR BLD AUTO: 57.6 % (ref 42.7–76)
NRBC BLD AUTO-RTO: 0 /100 WBC (ref 0–0.2)
PLATELET # BLD AUTO: 390 10*3/MM3 (ref 140–450)
PMV BLD AUTO: 9.4 FL (ref 6–12)
POTASSIUM SERPL-SCNC: 4 MMOL/L (ref 3.5–5.2)
PROT SERPL-MCNC: 7.2 G/DL (ref 6–8.5)
RBC # BLD AUTO: 4.12 10*6/MM3 (ref 3.77–5.28)
SODIUM SERPL-SCNC: 140 MMOL/L (ref 136–145)
TRIGL SERPL-MCNC: 93 MG/DL (ref 0–150)
TSH SERPL DL<=0.05 MIU/L-ACNC: 3.11 UIU/ML (ref 0.27–4.2)
VLDLC SERPL-MCNC: 17 MG/DL (ref 5–40)
WBC NRBC COR # BLD: 7.26 10*3/MM3 (ref 3.4–10.8)

## 2024-05-09 ENCOUNTER — OFFICE VISIT (OUTPATIENT)
Dept: FAMILY MEDICINE CLINIC | Facility: CLINIC | Age: 49
End: 2024-05-09
Payer: MEDICAID

## 2024-05-09 VITALS
RESPIRATION RATE: 18 BRPM | OXYGEN SATURATION: 98 % | BODY MASS INDEX: 27.36 KG/M2 | TEMPERATURE: 98.9 F | HEIGHT: 72 IN | WEIGHT: 202 LBS | HEART RATE: 88 BPM | SYSTOLIC BLOOD PRESSURE: 113 MMHG | DIASTOLIC BLOOD PRESSURE: 77 MMHG

## 2024-05-09 DIAGNOSIS — Z12.11 SCREEN FOR COLON CANCER: ICD-10-CM

## 2024-05-09 DIAGNOSIS — M25.562 ACUTE PAIN OF LEFT KNEE: ICD-10-CM

## 2024-05-09 DIAGNOSIS — S80.212A ABRASION OF LEFT KNEE, INITIAL ENCOUNTER: ICD-10-CM

## 2024-05-09 DIAGNOSIS — S80.211A ABRASION OF RIGHT KNEE, INITIAL ENCOUNTER: ICD-10-CM

## 2024-05-09 DIAGNOSIS — R06.02 SHORTNESS OF BREATH: Primary | ICD-10-CM

## 2024-05-09 DIAGNOSIS — S80.02XA CONTUSION OF LEFT KNEE, INITIAL ENCOUNTER: ICD-10-CM

## 2024-05-09 DIAGNOSIS — Z72.0 TOBACCO USE: ICD-10-CM

## 2024-05-09 DIAGNOSIS — Z79.899 CONTROLLED SUBSTANCE AGREEMENT SIGNED: ICD-10-CM

## 2024-05-09 DIAGNOSIS — Z23 NEED FOR VACCINATION: ICD-10-CM

## 2024-05-09 DIAGNOSIS — Z12.31 SCREENING MAMMOGRAM FOR BREAST CANCER: ICD-10-CM

## 2024-05-09 DIAGNOSIS — Z91.81 STATUS POST FALL: Primary | ICD-10-CM

## 2024-05-09 PROCEDURE — 90677 PCV20 VACCINE IM: CPT | Performed by: NURSE PRACTITIONER

## 2024-05-09 PROCEDURE — 1160F RVW MEDS BY RX/DR IN RCRD: CPT | Performed by: NURSE PRACTITIONER

## 2024-05-09 PROCEDURE — 99214 OFFICE O/P EST MOD 30 MIN: CPT | Performed by: NURSE PRACTITIONER

## 2024-05-09 PROCEDURE — T1015 CLINIC SERVICE: HCPCS | Performed by: NURSE PRACTITIONER

## 2024-05-09 PROCEDURE — 90472 IMMUNIZATION ADMIN EACH ADD: CPT | Performed by: NURSE PRACTITIONER

## 2024-05-09 PROCEDURE — 1159F MED LIST DOCD IN RCRD: CPT | Performed by: NURSE PRACTITIONER

## 2024-05-09 PROCEDURE — 3078F DIAST BP <80 MM HG: CPT | Performed by: NURSE PRACTITIONER

## 2024-05-09 PROCEDURE — 3074F SYST BP LT 130 MM HG: CPT | Performed by: NURSE PRACTITIONER

## 2024-05-09 PROCEDURE — 1125F AMNT PAIN NOTED PAIN PRSNT: CPT | Performed by: NURSE PRACTITIONER

## 2024-05-09 PROCEDURE — 90715 TDAP VACCINE 7 YRS/> IM: CPT | Performed by: NURSE PRACTITIONER

## 2024-05-09 PROCEDURE — 90471 IMMUNIZATION ADMIN: CPT | Performed by: NURSE PRACTITIONER

## 2024-05-09 RX ORDER — LIDOCAINE 50 MG/G
1 OINTMENT TOPICAL
Qty: 50 G | Refills: 0 | Status: SHIPPED | OUTPATIENT
Start: 2024-05-09

## 2024-05-09 RX ORDER — METHYLPREDNISOLONE 4 MG/1
TABLET ORAL
Qty: 21 TABLET | Refills: 0 | Status: SHIPPED | OUTPATIENT
Start: 2024-05-09

## 2024-05-09 RX ORDER — GABAPENTIN 100 MG/1
100 CAPSULE ORAL 2 TIMES DAILY PRN
Qty: 14 CAPSULE | Refills: 0 | Status: SHIPPED | OUTPATIENT
Start: 2024-05-09

## 2024-05-10 RX ORDER — ALBUTEROL SULFATE 90 UG/1
2 AEROSOL, METERED RESPIRATORY (INHALATION) AS NEEDED
Qty: 6.7 G | Refills: 2 | Status: SHIPPED | OUTPATIENT
Start: 2024-05-10

## 2024-05-14 DIAGNOSIS — S80.212A ABRASION OF LEFT KNEE, INITIAL ENCOUNTER: ICD-10-CM

## 2024-05-14 DIAGNOSIS — S80.02XA CONTUSION OF LEFT KNEE, INITIAL ENCOUNTER: ICD-10-CM

## 2024-05-14 DIAGNOSIS — Z91.81 STATUS POST FALL: ICD-10-CM

## 2024-05-14 DIAGNOSIS — M25.562 ACUTE PAIN OF LEFT KNEE: ICD-10-CM

## 2024-05-14 NOTE — PROGRESS NOTES
Rachelleaj Hummel  [id number]  1975  adult     05/09/2024      Chief Complaint  Knee Pain and Fall (Fell on Monday afternoon. Rest, elevation, ice,compression and Ibuprofen. The more she uses the more aggravated it gets. Also, fell on hands and wrists. /)    History of Present Illness  48 year old female patient presents today complaining of left knee pain status post fall. States she fell on Monday afternoon while outside. Fell directly onto her left knee on asphalt. States she has been doing RICE method and using OTC ibuprofen. Agrees on left knee xray. States she will obtain at [facility]. Denies hitting her head or losing consciousness. Denies foreign body to left knee. Abrasion noted to bilateral knees. Denies right knee pain. Denies bleeding, drainage, redness, numbness, tingling, etc.     Requesting mammogram be done at [facility]. Declined clinical breast exam. Agrees on colonoscopy. Agrees to update TDAP vaccine. Agrees on Prevnar 20 vaccine today.               Review of Systems   Constitutional: Negative.    HENT: Negative.     Eyes: Negative.    Respiratory: Negative.     Cardiovascular: Negative.    Gastrointestinal: Negative.    Endocrine: Negative.    Genitourinary: Negative.    Musculoskeletal: Negative.  Positive for arthralgias (left knee). Negative for back pain, gait problem, joint swelling, myalgias, neck pain and neck stiffness.   Skin: Negative.    Allergic/Immunologic: Negative.    Neurological: Negative.    Hematological: Negative.    Psychiatric/Behavioral: Negative.         Past Medical History:   Diagnosis Date    Allergic     Ankylosing spondylitis of site in spine     Anorexia nervosa     Arthritis of back     Asthma     Autism spectrum disorder     Bursitis of hip     Colon polyp     Crohn disease     Crohn disease     Depression     GERD (gastroesophageal reflux disease)     Headache     Hip arthrosis     HTN (hypertension)     Irritable bowel syndrome      "Kidney stone     Low back pain     MS (multiple sclerosis)     Obesity     Pneumonia     PTSD (post-traumatic stress disorder)     Rheumatoid arthritis     Vitamin D deficiency        Past Surgical History:   Procedure Laterality Date    CHOLECYSTECTOMY      COLON RESECTION      COLONOSCOPY  09/02/2019    MOUTH SURGERY  08/02/2021       Family History   Problem Relation Age of Onset    Diabetes Mother     Hypertension Mother     Atrial fibrillation Mother     Hypertension Father     Crohn's disease Father     Hypertension Maternal Grandmother     Hypertension Maternal Grandfather     Cancer Maternal Grandfather         SKIN CANCER    Hypertension Paternal Grandmother     Hypertension Paternal Grandfather     Heart disease Paternal Grandfather        Social History     Socioeconomic History    Marital status:      Spouse name: Juan Cline   Tobacco Use    Smoking status: Light Smoker     Current packs/day: 0.25     Average packs/day: 0.3 packs/day for 12.3 years (3.1 ttl pk-yrs)     Types: Cigarettes     Start date: 2/1/2012     Passive exposure: Current    Smokeless tobacco: Never    Tobacco comments:     Social smoking   Vaping Use    Vaping status: Former    Substances: Nicotine, Flavoring    Passive vaping exposure: Yes   Substance and Sexual Activity    Alcohol use: Yes     Comment: occasionally    Drug use: Yes     Types: Marijuana     Comment: for pain management. Homemde THC oil    Sexual activity: Not Currently        Allergies   Allergen Reactions    Sulfa Antibiotics Hives         Objective   Vital Signs:   /77 (BP Location: Left arm, Patient Position: Sitting, Cuff Size: Adult)   Pulse 88   Temp 98.9 °F (37.2 °C) (Temporal)   Resp 18   Ht 185.4 cm (73\")   Wt 91.6 kg (202 lb)   SpO2 98%   BMI 26.65 kg/m²       Physical Exam  Vitals and nursing note reviewed.   Constitutional:       General: Rachelle is not in acute distress.     Appearance: Normal appearance. Rachelle is not " ill-appearing, toxic-appearing or diaphoretic.   HENT:      Head: Normocephalic and atraumatic.      Jaw: There is normal jaw occlusion.      Right Ear: Hearing and external ear normal.      Left Ear: Hearing and external ear normal.      Nose: Nose normal.      Mouth/Throat:      Lips: Pink.   Eyes:      General: Lids are normal. Vision grossly intact. Gaze aligned appropriately.      Extraocular Movements: Extraocular movements intact.      Conjunctiva/sclera: Conjunctivae normal.      Pupils: Pupils are equal, round, and reactive to light.   Cardiovascular:      Rate and Rhythm: Normal rate and regular rhythm.      Pulses: Normal pulses.           Carotid pulses are 2+ on the right side and 2+ on the left side.       Radial pulses are 2+ on the right side and 2+ on the left side.        Dorsalis pedis pulses are 2+ on the right side and 2+ on the left side.        Posterior tibial pulses are 2+ on the right side and 2+ on the left side.      Heart sounds: Normal heart sounds, S1 normal and S2 normal. No murmur heard.  Pulmonary:      Effort: Pulmonary effort is normal.      Breath sounds: Normal breath sounds and air entry.   Abdominal:      General: Abdomen is flat. Bowel sounds are normal. There is no distension or abdominal bruit.      Palpations: Abdomen is soft.      Tenderness: There is no abdominal tenderness.   Musculoskeletal:         General: Normal range of motion.      Cervical back: Full passive range of motion without pain, normal range of motion and neck supple.      Left knee: Bony tenderness present. Decreased range of motion.      Right lower leg: No edema.      Left lower leg: No edema.   Skin:     General: Skin is warm and dry.      Capillary Refill: Capillary refill takes less than 2 seconds.      Coloration: Skin is not pale.      Findings: Abrasion and bruising present. No abscess, erythema, laceration, rash or wound.   Neurological:      General: No focal deficit present.      Mental  Status: Rachelle is alert and oriented to person, place, and time. Mental status is at baseline.      GCS: GCS eye subscore is 4. GCS verbal subscore is 5. GCS motor subscore is 6.      Cranial Nerves: Cranial nerves 2-12 are intact. No cranial nerve deficit.      Sensory: Sensation is intact. No sensory deficit.      Motor: Motor function is intact. No weakness.      Coordination: Coordination is intact. Coordination normal.      Gait: Gait is intact. Gait normal.      Deep Tendon Reflexes: Reflexes normal.   Psychiatric:         Attention and Perception: Attention and perception normal.         Mood and Affect: Mood and affect normal.         Speech: Speech normal.         Behavior: Behavior normal. Behavior is cooperative.         Thought Content: Thought content normal.         Cognition and Memory: Cognition and memory normal.         Judgment: Judgment normal.                 Assessment and Plan   Diagnoses and all orders for this visit:    1. Status post fall (Primary)  -     XR Knee 3 View Left; Future  -     methylPREDNISolone (MEDROL) 4 MG dose pack; Take as directed on package instructions.  Dispense: 21 tablet; Refill: 0  -     gabapentin (NEURONTIN) 100 MG capsule; Take 1 capsule by mouth 2 (Two) Times a Day As Needed (knee pain).  Dispense: 14 capsule; Refill: 0    2. Acute pain of left knee  -     XR Knee 3 View Left; Future  -     lidocaine (XYLOCAINE) 5 % ointment; Apply 1 Application topically to the appropriate area as directed Every 2 (Two) Hours As Needed for Mild Pain.  Dispense: 50 g; Refill: 0  -     methylPREDNISolone (MEDROL) 4 MG dose pack; Take as directed on package instructions.  Dispense: 21 tablet; Refill: 0  -     gabapentin (NEURONTIN) 100 MG capsule; Take 1 capsule by mouth 2 (Two) Times a Day As Needed (knee pain).  Dispense: 14 capsule; Refill: 0    3. Abrasion of right knee, initial encounter    4. Abrasion of left knee, initial encounter  -     XR Knee 3 View Left; Future    5.  Contusion of left knee, initial encounter  -     XR Knee 3 View Left; Future    6. Screen for colon cancer  -     Ambulatory Referral For Screening Colonoscopy    7. Tobacco use    8. Need for vaccination  -     Pneumococcal Conjugate Vaccine 20-Valent (PCV20)  -     Tdap Vaccine => 6yo IM (BOOSTRIX)    9. Screening mammogram for breast cancer  -     Mammo Screening Bilateral With CAD; Future    10. Controlled substance agreement signed    Status post fall  -Pending left knee xray.  -Treating left knee pain with steroid dose pack, gabapentin x7 days, and topical lidocaine.  -CSA signed.     -Ordered screening mammogram at [facility]. Declined clinical breast exam.    -Ordered screening colonoscopy.    -Updated TDAP vaccine.  -Given Prevnar 20 vaccine.    -Discussed ER red flags.   -Instructed to follow up with PCP in 2 months, sooner if symptoms fail to improve or worsen.    Follow Up   Return in about 2 months (around 7/9/2024) for Recheck.    Patient Instructions   RICE Therapy for Routine Care of Injuries  Many injuries can be cared for with rest, ice, compression, and elevation (RICE therapy). This includes:  Resting the injured body part.  Putting ice on the injury.  Putting pressure (compression) on the injury.  Raising the injured part (elevation).  Using RICE therapy can help to lessen pain and swelling.  Supplies needed:  Ice.  Plastic bag.  Towel.  Elastic bandage.  Pillow or pillows to raise your injured body part.  How to care for your injury with RICE therapy  Rest  Try to rest the injured part of your body. You can go back to your normal activities when your doctor says it is okay to do them and when you can do them without pain.  If you rest the injury too much, it may not heal as well. Some injuries heal better with early movement instead of resting for too long. Ask your doctor if you should do exercises to help your injury get better.  Ice    If told, put ice on the injured area. To do  this:  Put ice in a plastic bag.  Place a towel between your skin and the bag.  Leave the ice on for 20 minutes, 2-3 times a day.  Take off the ice if your skin turns bright red. This is very important. If you cannot feel pain, heat, or cold, you have a greater risk of damage to the area.  Do not put ice on your bare skin. Use ice for as many days as your doctor tells you to use it.  Compression  Put pressure on the injured area. This can be done with an elastic bandage. If this type of bandage has been put on your injury:  Follow instructions on the package the bandage came in about how to use it.  Do not wrap the bandage too tightly.  Wrap the bandage more loosely if part of your body beyond the bandage is blue, swollen, cold, painful, or loses feeling.  Take off the bandage and put it on again every 3-4 hours or as told by your doctor.  See your doctor if the bandage seems to make your problems worse.    Elevation  Raise the injured area above the level of your heart while you are sitting or lying down.  Follow these instructions at home:  If your symptoms get worse or last a long time, make a follow-up appointment with your doctor. You may need to have imaging tests, such as X-rays or an MRI.  If you have imaging tests, ask how to get your results when they are ready.  Return to your normal activities when your doctor says that it is safe.  Keep all follow-up visits.  Contact a doctor if:  You keep having pain and swelling.  Your symptoms get worse.  Get help right away if:  You have sudden, very bad pain at your injury or lower than your injury.  You have redness or more swelling around your injury.  You have tingling or numbness at your injury or lower than your injury, and it does not go away when you take off the bandage.  Summary  Many injuries can be cared for using rest, ice, compression, and elevation (RICE therapy).  You can go back to your normal activities when your doctor says it is okay and when you  can do them without pain.  Put ice on the injured area as told by your doctor.  Get help if your symptoms get worse or if you keep having pain and swelling.  This information is not intended to replace advice given to you by your health care provider. Make sure you discuss any questions you have with your health care provider.  Document Revised: 10/07/2021 Document Reviewed: 10/07/2021  [employer]vier Patient Education © 2022 [employer]

## 2024-07-09 ENCOUNTER — OFFICE VISIT (OUTPATIENT)
Dept: FAMILY MEDICINE CLINIC | Facility: CLINIC | Age: 49
End: 2024-07-09
Payer: MEDICAID

## 2024-07-09 VITALS
DIASTOLIC BLOOD PRESSURE: 72 MMHG | HEIGHT: 72 IN | OXYGEN SATURATION: 97 % | WEIGHT: 213.8 LBS | HEART RATE: 101 BPM | RESPIRATION RATE: 18 BRPM | BODY MASS INDEX: 28.96 KG/M2 | SYSTOLIC BLOOD PRESSURE: 128 MMHG | TEMPERATURE: 99 F

## 2024-07-09 DIAGNOSIS — F41.9 ANXIETY: ICD-10-CM

## 2024-07-09 DIAGNOSIS — Z13.228 SCREENING FOR ENDOCRINE, METABOLIC AND IMMUNITY DISORDER: ICD-10-CM

## 2024-07-09 DIAGNOSIS — Z13.220 SCREENING FOR LIPID DISORDERS: ICD-10-CM

## 2024-07-09 DIAGNOSIS — Z13.1 SCREENING FOR DIABETES MELLITUS: ICD-10-CM

## 2024-07-09 DIAGNOSIS — Z13.29 SCREENING FOR ENDOCRINE, METABOLIC AND IMMUNITY DISORDER: ICD-10-CM

## 2024-07-09 DIAGNOSIS — R41.840 ATTENTION OR CONCENTRATION DEFICIT: Primary | ICD-10-CM

## 2024-07-09 DIAGNOSIS — Z13.0 SCREENING FOR ENDOCRINE, METABOLIC AND IMMUNITY DISORDER: ICD-10-CM

## 2024-07-09 DIAGNOSIS — Z13.29 SCREENING FOR THYROID DISORDER: ICD-10-CM

## 2024-07-09 PROCEDURE — T1015 CLINIC SERVICE: HCPCS | Performed by: REGISTERED NURSE

## 2024-07-09 PROCEDURE — 99214 OFFICE O/P EST MOD 30 MIN: CPT | Performed by: REGISTERED NURSE

## 2024-07-09 PROCEDURE — 1125F AMNT PAIN NOTED PAIN PRSNT: CPT | Performed by: REGISTERED NURSE

## 2024-07-09 PROCEDURE — 3078F DIAST BP <80 MM HG: CPT | Performed by: REGISTERED NURSE

## 2024-07-09 PROCEDURE — 1159F MED LIST DOCD IN RCRD: CPT | Performed by: REGISTERED NURSE

## 2024-07-09 PROCEDURE — 3074F SYST BP LT 130 MM HG: CPT | Performed by: REGISTERED NURSE

## 2024-07-09 PROCEDURE — 1160F RVW MEDS BY RX/DR IN RCRD: CPT | Performed by: REGISTERED NURSE

## 2024-07-09 NOTE — PROGRESS NOTES
Chief Complaint  Follow-up, ADHD (Patient would like to speak with you about ADHD Medication ), and Anxiety (Patient would like to speak with you about Anxiety medication )    Subjective    History of Present Illness {CC  Problem List  Visit  Diagnosis   Encounters  Notes  Medications  Labs  Result Review Imaging  Media :23}     Rachelle Hummel presents to [facility] for Follow-up, ADHD (Patient would like to speak with you about ADHD Medication ), and Anxiety (Patient would like to speak with you about Anxiety medication ).      History of Present Illness  Patient is a 48 y.o. other  presents to the clinic today for 2-month follow-up for left knee injury and concerns of worsening concentration deficit and anxiety greater than 20 years.  Patient denies any chest pain, shortness of breath, or any fevers.  Patient denies any known exposure to COVID, flu, or any other contagious illnesses.    In regards to left knee injury, patient shares that she is healing with this.  She shares that she still has moments of pain but overall continues to heal.  Patient denies any concerns or issues with this knee injury or knee pain at this time.    In regards to concentration deficit and anxiety, patient shares that she has struggled with concentration deficit since childhood.  She shares that she currently has difficulty completing tasks.  She shares that she starts tasks but does not follow through and gets distracted by other tasks during this process.  She shares that this is become increasingly more challenging as she ages would like to discuss options for potential treatment.  Patient voices that she believes she has ADHD and would like to pursue treatment for this.  We discussed options and patient will need to go through mental health to discuss this as a potential diagnosis prior to any treatment being able to be started.  Patient has established with [facility] in the past.   "She would like a referral to restart today.       Review of Systems   Constitutional: Negative.  Negative for activity change, chills, fatigue and fever.   HENT: Negative.  Negative for congestion, dental problem, ear pain, hearing loss, rhinorrhea, sinus pain, sore throat, tinnitus and trouble swallowing.    Eyes: Negative.  Negative for pain and visual disturbance.   Respiratory: Negative.  Negative for cough, chest tightness, shortness of breath and wheezing.    Cardiovascular: Negative.  Negative for chest pain, palpitations and leg swelling.   Gastrointestinal: Negative.  Negative for abdominal pain, diarrhea, nausea and vomiting.   Endocrine: Negative.  Negative for polydipsia, polyphagia and polyuria.   Genitourinary: Negative.  Negative for difficulty urinating, dysuria, frequency and urgency.   Musculoskeletal: Negative.  Negative for arthralgias, back pain and myalgias.   Skin: Negative.  Negative for color change, pallor, rash and wound.   Allergic/Immunologic: Negative.  Negative for environmental allergies.   Neurological: Negative.  Negative for dizziness, speech difficulty, weakness, light-headedness, numbness and headaches.   Hematological: Negative.    Psychiatric/Behavioral:  Positive for decreased concentration. Negative for confusion, self-injury and suicidal ideas. The patient is nervous/anxious.    All other systems reviewed and are negative.       Objective     Vital Signs:   /72 (BP Location: Left arm, Patient Position: Sitting, Cuff Size: Large Adult)   Pulse 101   Temp 99 °F (37.2 °C) (Oral)   Resp 18   Ht 185.4 cm (72.99\")   Wt 97 kg (213 lb 12.8 oz)   SpO2 97%   BMI 28.21 kg/m²   Current Outpatient Medications on File Prior to Visit   Medication Sig Dispense Refill    ACETAMINOPHEN ER PO Take 500 mg by mouth Every 6 (Six) Hours.      albuterol sulfate  (90 Base) MCG/ACT inhaler Inhale 2 puffs As Needed for Shortness of Air. 6.7 g 2    multivitamin with minerals tablet " tablet Take 1 tablet by mouth Daily.      [DISCONTINUED] benzonatate (Tessalon Perles) 100 MG capsule Take 1 capsule by mouth 3 (Three) Times a Day As Needed for Cough. 30 capsule 1    [DISCONTINUED] diphenhydrAMINE (BENADRYL) 50 MG tablet Take 1 tablet by mouth As Needed.      [DISCONTINUED] guaifenesin-dextromethorphan (MUCINEX DM)  MG tablet sustained-release 12 hour tablet Take 1 tablet by mouth 2 (Two) Times a Day As Needed (cough and congestion). 60 tablet 1    [DISCONTINUED] lidocaine (XYLOCAINE) 5 % ointment Apply 1 Application topically to the appropriate area as directed Every 2 (Two) Hours As Needed for Mild Pain. 50 g 0    [DISCONTINUED] ondansetron ODT (ZOFRAN-ODT) 4 MG disintegrating tablet Place 1 tablet on the tongue As Needed.      [DISCONTINUED] promethazine (PHENERGAN) 25 MG tablet Take 1 tablet by mouth As Needed.      ePHEDrine HCl 12.5 MG tablet Take 1 tablet by mouth As Needed.      [DISCONTINUED] chlorpheniramine (CHLOR-TRIMETON) 4 MG tablet Take 1 tablet by mouth As Needed. (Patient not taking: Reported on 7/9/2024)      [DISCONTINUED] gabapentin (NEURONTIN) 100 MG capsule Take 1 capsule by mouth 2 (Two) Times a Day As Needed (knee pain). (Patient not taking: Reported on 7/9/2024) 14 capsule 0    [DISCONTINUED] hydrOXYzine (ATARAX) 25 MG tablet TAKE 1 TABLET BY MOUTH 2 (TWO) TIMES A DAY AS NEEDED FOR ANXIETY. (Patient not taking: Reported on 7/9/2024) 60 tablet 5    [DISCONTINUED] methylPREDNISolone (MEDROL) 4 MG dose pack Take as directed on package instructions. (Patient not taking: Reported on 7/9/2024) 21 tablet 0    [DISCONTINUED] phenylephrine (SUDAFED PE) 10 MG tablet Take 1 tablet by mouth Every 4 (Four) Hours As Needed for Congestion. (Patient not taking: Reported on 7/9/2024)       No current facility-administered medications on file prior to visit.        Past Medical History:   Diagnosis Date    Allergic     Ankylosing spondylitis of site in spine     Anorexia nervosa      Arthritis of back     Asthma     Autism spectrum disorder     Bursitis of hip     Colon polyp     Crohn disease     Crohn disease     Depression     GERD (gastroesophageal reflux disease)     Headache     Hip arthrosis     HTN (hypertension)     Irritable bowel syndrome     Kidney stone     Low back pain     MS (multiple sclerosis)     Obesity     Pneumonia     PTSD (post-traumatic stress disorder)     Rheumatoid arthritis     Vitamin D deficiency       Past Surgical History:   Procedure Laterality Date    CHOLECYSTECTOMY      COLON RESECTION      COLONOSCOPY  09/02/2019    MOUTH SURGERY  08/02/2021      Family History   Problem Relation Age of Onset    Diabetes Mother     Hypertension Mother     Atrial fibrillation Mother     Hypertension Father     Crohn's disease Father     Hypertension Maternal Grandmother     Hypertension Maternal Grandfather     Cancer Maternal Grandfather         SKIN CANCER    Hypertension Paternal Grandmother     Hypertension Paternal Grandfather     Heart disease Paternal Grandfather       Social History     Socioeconomic History    Marital status:      Spouse name: Juan Cline   Tobacco Use    Smoking status: Light Smoker     Current packs/day: 0.25     Average packs/day: 0.3 packs/day for 12.4 years (3.1 ttl pk-yrs)     Types: Cigarettes     Start date: 2/1/2012     Passive exposure: Current    Smokeless tobacco: Never    Tobacco comments:     Social smoking   Substance and Sexual Activity    Alcohol use: Yes     Comment: occasionally    Drug use: Yes     Types: Marijuana     Comment: for pain management. Homemde THC oil    Sexual activity: Not Currently         No visits with results within 3 Month(s) from this visit.   Latest known visit with results is:   Office Visit on 10/24/2022   Component Date Value Ref Range Status    Hemoglobin A1C 10/24/2022 6.2 (H)  3.5 - 5.6 % Final    Glucose 10/24/2022 96  65 - 99 mg/dL Final    BUN 10/24/2022 6  6 - 20 mg/dL Final    Creatinine  10/24/2022 0.73  0.57 - 1.00 mg/dL Final    Sodium 10/24/2022 140  136 - 145 mmol/L Final    Potassium 10/24/2022 4.0  3.5 - 5.2 mmol/L Final    Chloride 10/24/2022 105  98 - 107 mmol/L Final    CO2 10/24/2022 23.3  22.0 - 29.0 mmol/L Final    Calcium 10/24/2022 9.3  8.6 - 10.5 mg/dL Final    Total Protein 10/24/2022 7.2  6.0 - 8.5 g/dL Final    Albumin 10/24/2022 4.20  3.50 - 5.20 g/dL Final    ALT (SGPT) 10/24/2022 23  1 - 33 U/L Final    AST (SGOT) 10/24/2022 19  1 - 32 U/L Final    Alkaline Phosphatase 10/24/2022 63  39 - 117 U/L Final    Total Bilirubin 10/24/2022 0.2  0.0 - 1.2 mg/dL Final    Globulin 10/24/2022 3.0  gm/dL Final    A/G Ratio 10/24/2022 1.4  g/dL Final    BUN/Creatinine Ratio 10/24/2022 8.2  7.0 - 25.0 Final    Anion Gap 10/24/2022 11.7  5.0 - 15.0 mmol/L Final    eGFR 10/24/2022 103.5  >60.0 mL/min/1.73 Final    National Kidney Foundation and American Society of Nephrology (ASN) Task Force recommended calculation based on the Chronic Kidney Disease Epidemiology Collaboration (CKD-EPI) equation refit without adjustment for race.    Total Cholesterol 10/24/2022 169  0 - 200 mg/dL Final    Triglycerides 10/24/2022 93  0 - 150 mg/dL Final    HDL Cholesterol 10/24/2022 50  40 - 60 mg/dL Final    LDL Cholesterol  10/24/2022 102 (H)  0 - 100 mg/dL Final    VLDL Cholesterol 10/24/2022 17  5 - 40 mg/dL Final    LDL/HDL Ratio 10/24/2022 2.01   Final    TSH 10/24/2022 3.110  0.270 - 4.200 uIU/mL Final    Sed Rate 10/24/2022 44 (H)  0 - 20 mm/hr Final    WBC 10/24/2022 7.26  3.40 - 10.80 10*3/mm3 Final    RBC 10/24/2022 4.12  3.77 - 5.28 10*6/mm3 Final    Hemoglobin 10/24/2022 12.7  12.0 - 15.9 g/dL Final    Hematocrit 10/24/2022 37.5  34.0 - 46.6 % Final    MCV 10/24/2022 91.0  79.0 - 97.0 fL Final    MCH 10/24/2022 30.8  26.6 - 33.0 pg Final    MCHC 10/24/2022 33.9  31.5 - 35.7 g/dL Final    RDW 10/24/2022 12.9  12.3 - 15.4 % Final    RDW-SD 10/24/2022 42.4  37.0 - 54.0 fl Final    MPV 10/24/2022 9.4   6.0 - 12.0 fL Final    Platelets 10/24/2022 390  140 - 450 10*3/mm3 Final    Neutrophil % 10/24/2022 57.6  42.7 - 76.0 % Final    Lymphocyte % 10/24/2022 22.5  19.6 - 45.3 % Final    Monocyte % 10/24/2022 10.3  5.0 - 12.0 % Final    Eosinophil % 10/24/2022 7.9 (H)  0.3 - 6.2 % Final    Basophil % 10/24/2022 1.4  0.0 - 1.5 % Final    Immature Grans % 10/24/2022 0.3  0.0 - 0.5 % Final    Neutrophils, Absolute 10/24/2022 4.19  1.70 - 7.00 10*3/mm3 Final    Lymphocytes, Absolute 10/24/2022 1.63  0.70 - 3.10 10*3/mm3 Final    Monocytes, Absolute 10/24/2022 0.75  0.10 - 0.90 10*3/mm3 Final    Eosinophils, Absolute 10/24/2022 0.57 (H)  0.00 - 0.40 10*3/mm3 Final    Basophils, Absolute 10/24/2022 0.10  0.00 - 0.20 10*3/mm3 Final    Immature Grans, Absolute 10/24/2022 0.02  0.00 - 0.05 10*3/mm3 Final    nRBC 10/24/2022 0.0  0.0 - 0.2 /100 WBC Final         Physical Exam  Vitals and nursing note reviewed.   Constitutional:       Appearance: Normal appearance. Rachelle is normal weight.   HENT:      Head: Normocephalic and atraumatic.   Cardiovascular:      Rate and Rhythm: Normal rate and regular rhythm.      Pulses: Normal pulses.      Heart sounds: Normal heart sounds. No murmur heard.     No friction rub. No gallop.   Pulmonary:      Effort: Pulmonary effort is normal. No respiratory distress.      Breath sounds: Normal breath sounds. No stridor. No wheezing, rhonchi or rales.   Chest:      Chest wall: No tenderness.   Abdominal:      General: Abdomen is flat. Bowel sounds are normal. There is no distension.      Palpations: Abdomen is soft. There is no mass.      Tenderness: There is no abdominal tenderness. There is no right CVA tenderness, left CVA tenderness, guarding or rebound.      Hernia: No hernia is present.   Skin:     General: Skin is warm and dry.      Capillary Refill: Capillary refill takes less than 2 seconds.      Coloration: Skin is not jaundiced or pale.   Neurological:      General: No focal deficit  present.      Mental Status: Rachelle is alert and oriented to person, place, and time. Mental status is at baseline.      Motor: No weakness.      Coordination: Coordination normal.      Gait: Gait normal.   Psychiatric:         Mood and Affect: Mood normal.         Behavior: Behavior normal.         Thought Content: Thought content normal.         Judgment: Judgment normal.          Result Review  Data Reviewed:{ Labs  Result Review  Imaging  Med Tab  Media :23}   I have reviewed this patient's chart.  I have reviewed previous labs, previous imaging, previous medications, and previous encounters with notes that were available in this patient's chart.               Assessment and Plan {CC Problem List  Visit Diagnosis  ROS  Review (Popup)  [facility]  Quality  BestPractice  Medications  SmartSets  SnapShot Encounters  Media :23}   Diagnoses and all orders for this visit:    1. Attention or concentration deficit (Primary)  -     Ambulatory Referral to Behavioral Health    2. Anxiety  -     Ambulatory Referral to Behavioral Health    3. Screening for endocrine, metabolic and immunity disorder  -     CBC & Differential; Future  -     Comprehensive Metabolic Panel; Future    4. Screening for lipid disorders  -     Lipid Panel; Future    5. Screening for diabetes mellitus  -     Hemoglobin A1c; Future    6. Screening for thyroid disorder  -     TSH; Future        -Discussed concentration deficit and anxiety with behavioral health specialist.  -Fasting labs tomorrow or when next available.  -ER red flags discussed with patient including risk versus benefit and education provided.  -Follow-up with me in 3 months or sooner if needed.    I spent 30 minutes caring for Rachelle on this date of service. This time includes time spent by me in the following activities:preparing for the visit, reviewing tests, obtaining and/or reviewing a separately obtained history, performing a medically appropriate  examination and/or evaluation , counseling and educating the patient/family/caregiver, ordering medications, tests, or procedures, referring and communicating with other health care professionals , documenting information in the medical record, independently interpreting results and communicating that information with the patient/family/caregiver, and care coordination.    Follow Up {Instructions Charge Capture  Follow-up Communications :23}     Patient was given instructions and counseling regarding Rachelle's condition or for health maintenance advice. Please see specific information pulled into the AVS (placed there by myself) if appropriate.    Return in about 3 months (around 10/9/2024) for routine follow up.    BMI is >= 25 and <30. (Overweight) The following options were offered after discussion;: exercise counseling/recommendations and nutrition counseling/recommendations       TELLO Solomon, FNP-BC

## 2024-07-25 ENCOUNTER — OFFICE VISIT (OUTPATIENT)
Dept: PSYCHIATRY | Facility: CLINIC | Age: 49
End: 2024-07-25
Payer: MEDICAID

## 2024-07-25 DIAGNOSIS — Z79.899 ENCOUNTER FOR LONG-TERM (CURRENT) USE OF OTHER MEDICATIONS: ICD-10-CM

## 2024-07-25 DIAGNOSIS — F41.1 GENERALIZED ANXIETY DISORDER: Chronic | ICD-10-CM

## 2024-07-25 DIAGNOSIS — F31.4 BIPOLAR 1 DISORDER, DEPRESSED, SEVERE: Primary | Chronic | ICD-10-CM

## 2024-07-25 DIAGNOSIS — F43.10 PTSD (POST-TRAUMATIC STRESS DISORDER): Chronic | ICD-10-CM

## 2024-07-25 RX ORDER — CLONAZEPAM 0.5 MG/1
0.5 TABLET ORAL DAILY PRN
Qty: 10 TABLET | Refills: 0 | Status: SHIPPED | OUTPATIENT
Start: 2024-07-25

## 2024-07-25 NOTE — PROGRESS NOTES
"Subjective   Rachelle Hummel is a 48 y.o. adult who presents today for follow up     Chief Complaint:   decreased concentration ,social anxiety     History of Present Illness: the pt suffered from depression for many years,   Was on different meds, was stable few years ago on latuda, cymbalta, wellbutrin, viibryd.  Meds were prescribed by PCP.      Last appt 9/21/21     Since then the pt had multiple medical procedures, Crohns disease since 11 yo, chronic pain  Today the pt stated she always had issues with concentration, ADHD suspected but never officially dsd , she was perfect student and \"was afraid not to be perfect \"   She was always reclusive  Did not get any mental health care until she was 16 yo  Traumatic childhood because of crohns disease, \"involuntary sigmoidoscopies\" at the age of 10 yo     The pt finished school and college in art and graphic design   Anxiety is still intense when she is around people but more stable [place]     The pt expressed interest in getting tested for autism and ADHD     The pt denied feeling depressed/hopeless/helpless,    Anxiety - intense when around people,   Few  panic attacks in crowded places   She worries about different things, unable to relax, denied AVH/SI/HI     Triggers - negative news, david    Alleviating factors - to read , to spend time outdoor   Last manic episode - 4-5 years ago   Denied alc use  THC -- for pain management, recommended by PCP     The following portions of the patient's history were reviewed and updated as appropriate: allergies, current medications, past family history, past medical history, past social history, past surgical history and problem list.    PAST PSYCHIATRIC HISTORY  Axis I  Affective/Bipolar Disorder, Anxiety/Panic Disorder, Posttraumatic Stress  inpt in [place] 2 times,   Axis II  Defer     PAST OUTPATIENT TREATMENT  Diagnosis treated:  Affective Disorder, Anxiety/Panic Disorder, Post-Traumatic Stress  [facility] in the " "past,  Treatment Type:  Psychotherapy (EMDR) , Medication Management  Prior Psychiatric Medications:  zoloft -  \"better than nothing but did not address all problems\"   Elavil - not effective   Valium, Xanax  Effective   Buspar  - not effective   Hydroxyzine - not effective   Support Groups:  Not now   Sequelae Of Mental Disorder:  social isolation, emotional distress          Interval History  No changes     Side Effects  Denied       Past Medical History:  Past Medical History:   Diagnosis Date    Allergic     Ankylosing spondylitis of site in spine     Anorexia nervosa     Arthritis of back     Asthma     Autism spectrum disorder     Bursitis of hip     Colon polyp     Crohn disease     Crohn disease     Depression     GERD (gastroesophageal reflux disease)     Headache     Hip arthrosis     HTN (hypertension)     Irritable bowel syndrome     Kidney stone     Low back pain     MS (multiple sclerosis)     Obesity     Pneumonia     PTSD (post-traumatic stress disorder)     Rheumatoid arthritis     Vitamin D deficiency        Social History:  Social History     Socioeconomic History    Marital status:      Spouse name: Juan Cline   Tobacco Use    Smoking status: Light Smoker     Current packs/day: 0.25     Average packs/day: 0.3 packs/day for 12.5 years (3.1 ttl pk-yrs)     Types: Cigarettes     Start date: 2/1/2012     Passive exposure: Current    Smokeless tobacco: Never    Tobacco comments:     Social smoking   Substance and Sexual Activity    Alcohol use: Yes     Comment: occasionally    Drug use: Yes     Types: Marijuana     Comment: for pain management. Homemde THC oil    Sexual activity: Not Currently       Family History:  Family History   Problem Relation Age of Onset    Diabetes Mother     Hypertension Mother     Atrial fibrillation Mother     Hypertension Father     Crohn's disease Father     Hypertension Maternal Grandmother     Hypertension Maternal Grandfather     Cancer Maternal Grandfather "         SKIN CANCER    Hypertension Paternal Grandmother     Hypertension Paternal Grandfather     Heart disease Paternal Grandfather        Past Surgical History:  Past Surgical History:   Procedure Laterality Date    CHOLECYSTECTOMY      COLON RESECTION      COLONOSCOPY  09/02/2019    MOUTH SURGERY  08/02/2021       Problem List:  Patient Active Problem List   Diagnosis    Allergy-induced asthma, mild intermittent, uncomplicated    Ankylosing spondylitis    Back pain    Crohn's disease    Current every day smoker    Fatigue    Hypertension    Inflammatory arthritis    Rheumatoid arthritis    Long term current use of systemic steroids    Multiple sclerosis    Immunosuppressive disease    Obesity    Other long term (current) drug therapy    Pain of right hip joint    Radiculopathy    Seasonal allergies    Trochanteric bursitis of right hip    Urinary tract infection    Vitamin D deficiency    Bipolar 1 disorder, depressed, severe    PTSD (post-traumatic stress disorder)    Autism    Generalized anxiety disorder       Allergy:   Allergies   Allergen Reactions    Sulfa Antibiotics Hives        Discontinued Medications:  There are no discontinued medications.      Current Medications:   Current Outpatient Medications   Medication Sig Dispense Refill    ACETAMINOPHEN ER PO Take 500 mg by mouth Every 6 (Six) Hours.      albuterol sulfate  (90 Base) MCG/ACT inhaler Inhale 2 puffs As Needed for Shortness of Air. 6.7 g 2    clonazePAM (KlonoPIN) 0.5 MG tablet Take 1 tablet by mouth Daily As Needed for Anxiety. 10 tablet 0    ePHEDrine HCl 12.5 MG tablet Take 1 tablet by mouth As Needed.      multivitamin with minerals tablet tablet Take 1 tablet by mouth Daily.       No current facility-administered medications for this visit.         Review of Symptoms:    Psychiatric/Behavioral: Negative for agitation, behavioral problems, confusion, decreased concentration, dysphoric mood, hallucinations, self-injury, sleep  disturbance and suicidal ideas. The patient is  Less depressed, less  nervous/anxious and is not hyperactive.        Physical Exam:   Last menstrual period 06/19/2024, not currently breastfeeding.    Mental Status Exam:   Hygiene:   good  Cooperation:  Cooperative  Eye Contact:  Good  Psychomotor Behavior:  Appropriate  Affect:  Full range and Appropriate  Mood: fluctates  Hopelessness: Denies  Speech:  Normal  Thought Process:  Goal directed and Linear  Thought Content:  Normal  Suicidal:  None  Homicidal:  None  Hallucinations:  None  Delusion:  None  Memory:   fair   Orientation:  Person, Place, Time and Situation  Reliability:  good  Insight:  Good  Judgement:  Good  Impulse Control:  Good  Physical/Medical Issues:  Yes        MSE from 9/21/2021 reviewed and accepted with changes   PHQ-9 Depression Screening  Little interest or pleasure in doing things? 1-->several days   Feeling down, depressed, or hopeless? 1-->several days   Trouble falling or staying asleep, or sleeping too much? 1-->several days   Feeling tired or having little energy? 0-->not at all   Poor appetite or overeating? 0-->not at all   Feeling bad about yourself - or that you are a failure or have let yourself or your family down? 1-->several days   Trouble concentrating on things, such as reading the newspaper or watching television? 3-->nearly every day   Moving or speaking so slowly that other people could have noticed? Or the opposite - being so fidgety or restless that you have been moving around a lot more than usual? 0-->not at all   Thoughts that you would be better off dead, or of hurting yourself in some way? 0-->not at all   PHQ-9 Total Score 7   If you checked off any problems, how difficult have these problems made it for you to do your work, take care of things at home, or get along with other people? somewhat difficult          Current every day smoker 3-10 mintues spent counseling Has reduced [place] use    I advised Rachelle quintana  the risks of tobacco use.     Lab Results:   No visits with results within 3 Month(s) from this visit.   Latest known visit with results is:   Office Visit on 10/24/2022   Component Date Value Ref Range Status    Hemoglobin A1C 10/24/2022 6.2 (H)  3.5 - 5.6 % Final    Glucose 10/24/2022 96  65 - 99 mg/dL Final    BUN 10/24/2022 6  6 - 20 mg/dL Final    Creatinine 10/24/2022 0.73  0.57 - 1.00 mg/dL Final    Sodium 10/24/2022 140  136 - 145 mmol/L Final    Potassium 10/24/2022 4.0  3.5 - 5.2 mmol/L Final    Chloride 10/24/2022 105  98 - 107 mmol/L Final    CO2 10/24/2022 23.3  22.0 - 29.0 mmol/L Final    Calcium 10/24/2022 9.3  8.6 - 10.5 mg/dL Final    Total Protein 10/24/2022 7.2  6.0 - 8.5 g/dL Final    Albumin 10/24/2022 4.20  3.50 - 5.20 g/dL Final    ALT (SGPT) 10/24/2022 23  1 - 33 U/L Final    AST (SGOT) 10/24/2022 19  1 - 32 U/L Final    Alkaline Phosphatase 10/24/2022 63  39 - 117 U/L Final    Total Bilirubin 10/24/2022 0.2  0.0 - 1.2 mg/dL Final    Globulin 10/24/2022 3.0  gm/dL Final    A/G Ratio 10/24/2022 1.4  g/dL Final    BUN/Creatinine Ratio 10/24/2022 8.2  7.0 - 25.0 Final    Anion Gap 10/24/2022 11.7  5.0 - 15.0 mmol/L Final    eGFR 10/24/2022 103.5  >60.0 mL/min/1.73 Final    National Kidney Foundation and American Society of Nephrology (ASN) Task Force recommended calculation based on the Chronic Kidney Disease Epidemiology Collaboration (CKD-EPI) equation refit without adjustment for race.    Total Cholesterol 10/24/2022 169  0 - 200 mg/dL Final    Triglycerides 10/24/2022 93  0 - 150 mg/dL Final    HDL Cholesterol 10/24/2022 50  40 - 60 mg/dL Final    LDL Cholesterol  10/24/2022 102 (H)  0 - 100 mg/dL Final    VLDL Cholesterol 10/24/2022 17  5 - 40 mg/dL Final    LDL/HDL Ratio 10/24/2022 2.01   Final    TSH 10/24/2022 3.110  0.270 - 4.200 uIU/mL Final    Sed Rate 10/24/2022 44 (H)  0 - 20 mm/hr Final    WBC 10/24/2022 7.26  3.40 - 10.80 10*3/mm3 Final    RBC 10/24/2022 4.12  3.77 - 5.28 10*6/mm3  Final    Hemoglobin 10/24/2022 12.7  12.0 - 15.9 g/dL Final    Hematocrit 10/24/2022 37.5  34.0 - 46.6 % Final    MCV 10/24/2022 91.0  79.0 - 97.0 fL Final    MCH 10/24/2022 30.8  26.6 - 33.0 pg Final    MCHC 10/24/2022 33.9  31.5 - 35.7 g/dL Final    RDW 10/24/2022 12.9  12.3 - 15.4 % Final    RDW-SD 10/24/2022 42.4  37.0 - 54.0 fl Final    MPV 10/24/2022 9.4  6.0 - 12.0 fL Final    Platelets 10/24/2022 390  140 - 450 10*3/mm3 Final    Neutrophil % 10/24/2022 57.6  42.7 - 76.0 % Final    Lymphocyte % 10/24/2022 22.5  19.6 - 45.3 % Final    Monocyte % 10/24/2022 10.3  5.0 - 12.0 % Final    Eosinophil % 10/24/2022 7.9 (H)  0.3 - 6.2 % Final    Basophil % 10/24/2022 1.4  0.0 - 1.5 % Final    Immature Grans % 10/24/2022 0.3  0.0 - 0.5 % Final    Neutrophils, Absolute 10/24/2022 4.19  1.70 - 7.00 10*3/mm3 Final    Lymphocytes, Absolute 10/24/2022 1.63  0.70 - 3.10 10*3/mm3 Final    Monocytes, Absolute 10/24/2022 0.75  0.10 - 0.90 10*3/mm3 Final    Eosinophils, Absolute 10/24/2022 0.57 (H)  0.00 - 0.40 10*3/mm3 Final    Basophils, Absolute 10/24/2022 0.10  0.00 - 0.20 10*3/mm3 Final    Immature Grans, Absolute 10/24/2022 0.02  0.00 - 0.05 10*3/mm3 Final    nRBC 10/24/2022 0.0  0.0 - 0.2 /100 WBC Final       Assessment & Plan   Problems Addressed this Visit       Bipolar 1 disorder, depressed, severe - Primary (Chronic)    Relevant Orders    [facility] Abbreviated Urine Drug Screen -    PTSD (post-traumatic stress disorder) (Chronic)    Relevant Orders    [facility] Abbreviated Urine Drug Screen -    Generalized anxiety disorder (Chronic)    Relevant Medications    clonazePAM (KlonoPIN) 0.5 MG tablet    Other Relevant Orders    [facility] Abbreviated Urine Drug Screen -     Other Visit Diagnoses       Encounter for long-term (current) use of other medications        Relevant Orders    [facility] Abbreviated Urine Drug Screen -          Diagnoses         Codes Comments    Bipolar 1 disorder, depressed, severe    -  Primary ICD-10-CM:  F31.4  ICD-9-CM: 296.53     PTSD (post-traumatic stress disorder)     ICD-10-CM: F43.10  ICD-9-CM: 309.81     Generalized anxiety disorder     ICD-10-CM: F41.1  ICD-9-CM: 300.02     Encounter for long-term (current) use of other medications     ICD-10-CM: Z79.899  ICD-9-CM: V58.69             Visit Diagnoses:    ICD-10-CM ICD-9-CM   1. Bipolar 1 disorder, depressed, severe  F31.4 296.53   2. PTSD (post-traumatic stress disorder)  F43.10 309.81   3. Generalized anxiety disorder  F41.1 300.02   4. Encounter for long-term (current) use of other medications  Z79.899 V58.69         TREATMENT PLAN/GOALS: Continue supportive psychotherapy efforts and medications as indicated. Treatment and medication options discussed during today's visit. Patient ackowledged and verbally consented to continue with current treatment plan and was educated on the importance of compliance with treatment and follow-up appointments.    MEDICATION ISSUES:  1. Bipolar d/o - the pt reported feeling stable without meds     2. PTSD - coping skills   3. MIREILLE - start low dose of clonazepam 0.5 mg po QD PRN for anxiety when she needs to go out for medical procedures and doctors visits      INSPECT reviewed as expected - gabapentin on 5/9/24 for 7 days     UDS (dec 2, 2020)  + benzo (consistent) and + THC >300   Will repeat random in few weeks     PHQ scored  7  and indicated mild depression   MIREILLE 7  scored 10     Patient screened positive for depression based on a PHQ-9 score of 7 on 7/25/2024. Follow-up recommendations include:  just coping skills       The pt was referred to psychologist for diagnostic clarification   Referral list provided     Discussed medication options and treatment plan of prescribed medication as well as the risks, benefits, and side effects including potential falls, possible impaired driving and metabolic adversities among others. Patient is agreeable to call the office with any worsening of symptoms or onset of side effects.  Patient is agreeable to call 911 or go to the nearest ER should he/she begin having SI/HI. No medication side effects or related complaints today.     MEDS ORDERED DURING VISIT:  New Medications Ordered This Visit   Medications    clonazePAM (KlonoPIN) 0.5 MG tablet     Sig: Take 1 tablet by mouth Daily As Needed for Anxiety.     Dispense:  10 tablet     Refill:  0       Return in about 3 months (around 10/25/2024).          This document has been electronically signed by Roxann Johnson MD  July 25, 2024 12:50 EDT

## 2024-09-11 DIAGNOSIS — F41.1 GENERALIZED ANXIETY DISORDER: Chronic | ICD-10-CM

## 2024-09-11 NOTE — TELEPHONE ENCOUNTER
Rx Refill Note  Requested Prescriptions     Pending Prescriptions Disp Refills    clonazePAM (KlonoPIN) 0.5 MG tablet [Pharmacy Med Name: clonazePAM 0.5 MG Oral Tablet] 10 tablet 0     Sig: TAKE 1 TABLET BY MOUTH ONCE DAILY AS NEEDED FOR ANXIETY      Last office visit with prescribing clinician: 7/25/2024   Last telemedicine visit with prescribing clinician: Visit date not found   Next office visit with prescribing clinician: 10/24/2024   Office Visit with Roxann Johnson MD (07/25/2024)   [place] Abbreviated Urine Drug Screen - (07/25/2024)                     Would you like a call back once the refill request has been completed: [] Yes [] No    If the office needs to give you a call back, can they leave a voicemail: [] Yes [] No    Luiza Good MA  09/11/24, 12:25 EDT    UPLOADED

## 2024-09-13 RX ORDER — CLONAZEPAM 0.5 MG/1
0.5 TABLET ORAL DAILY PRN
Qty: 10 TABLET | Refills: 0 | Status: SHIPPED | OUTPATIENT
Start: 2024-09-13

## 2024-10-24 ENCOUNTER — OFFICE VISIT (OUTPATIENT)
Dept: PSYCHIATRY | Facility: CLINIC | Age: 49
End: 2024-10-24
Payer: MEDICAID

## 2024-10-24 DIAGNOSIS — F43.10 PTSD (POST-TRAUMATIC STRESS DISORDER): Chronic | ICD-10-CM

## 2024-10-24 DIAGNOSIS — F31.4 BIPOLAR 1 DISORDER, DEPRESSED, SEVERE: Primary | Chronic | ICD-10-CM

## 2024-10-24 DIAGNOSIS — F41.1 GENERALIZED ANXIETY DISORDER: Chronic | ICD-10-CM

## 2024-10-24 RX ORDER — CLONAZEPAM 0.5 MG/1
0.5 TABLET ORAL DAILY PRN
Qty: 10 TABLET | Refills: 2 | Status: SHIPPED | OUTPATIENT
Start: 2024-10-24

## 2024-10-24 RX ORDER — CLONAZEPAM 0.5 MG/1
0.5 TABLET ORAL DAILY PRN
Qty: 10 TABLET | Refills: 0 | OUTPATIENT
Start: 2024-10-24

## 2024-10-24 NOTE — PROGRESS NOTES
"Subjective   Rachelle Hummel is a 48 y.o. adult who presents today for follow up     Chief Complaint:   \"good days and bad days\"    History of Present Illness: the pt suffered from depression for many years,   Was on different meds, was stable few years ago on latuda, cymbalta, wellbutrin, viibryd.  Meds were prescribed by PCP.  Since then the pt had multiple medical procedures, Crohns disease since 9 yo, chronic pain      Today the pt reported feeling more anxious, increased anxiety on 9/11, the pt used to work as EMT    Depression - not the issues recently   Anxiety intense and persistent, death in the family   The pt  always had issues with concentration, ADHD suspected but never officially dsd , she was perfect student and \"was afraid not to be perfect \"   She was always reclusive  Did not get any mental health care until she was 16 yo  Traumatic childhood because of crohns disease, \"involuntary sigmoidoscopies\" at the age of 8 yo   The pt expressed interest in getting tested for autism and ADHD - referred to psychologicl evaluation,   not done yet but now she wants to address brain injury - recommended to go to [facility] program   The pt denied feeling depressed/hopeless/helpless,    Anxiety - intense when around people,   Few  panic attacks in crowded places   She worries about different things, unable to relax, denied AVH/SI/HI   Triggers - negative news, david    Alleviating factors - to read , to spend time outdoor , crafts   Last manic episode - 4-5 years ago   Denied alc use  THC -- for pain management, recommended by PCP     The following portions of the patient's history were reviewed and updated as appropriate: allergies, current medications, past family history, past medical history, past social history, past surgical history and problem list.    PAST PSYCHIATRIC HISTORY  Axis I  Affective/Bipolar Disorder, Anxiety/Panic Disorder, Posttraumatic Stress  inpt in [place] 2 times,   Axis II  Defer " "    PAST OUTPATIENT TREATMENT  Diagnosis treated:  Affective Disorder, Anxiety/Panic Disorder, Post-Traumatic Stress  [facility] in the past,  Treatment Type:  Psychotherapy (EMDR) , Medication Management  Prior Psychiatric Medications:  zoloft -  \"better than nothing but did not address all problems\"   Elavil - not effective   Valium, Xanax  Effective   Buspar  - not effective   Hydroxyzine - not effective   Support Groups:  Not now   Sequelae Of Mental Disorder:  social isolation, emotional distress          Interval History  No changes     Side Effects  Denied       Past Medical History:  Past Medical History:   Diagnosis Date    Allergic     Ankylosing spondylitis of site in spine     Anorexia nervosa     Arthritis of back     Asthma     Autism spectrum disorder     Bursitis of hip     Colon polyp     Crohn disease     Crohn disease     Depression     GERD (gastroesophageal reflux disease)     Headache     Hip arthrosis     HTN (hypertension)     Irritable bowel syndrome     Kidney stone     Low back pain     MS (multiple sclerosis)     Obesity     Pneumonia     PTSD (post-traumatic stress disorder)     Rheumatoid arthritis     Vitamin D deficiency        Social History:  Social History     Socioeconomic History    Marital status:      Spouse name: Juan Cline   Tobacco Use    Smoking status: Light Smoker     Current packs/day: 0.25     Average packs/day: 0.3 packs/day for 12.7 years (3.2 ttl pk-yrs)     Types: Cigarettes     Start date: 2/1/2012     Passive exposure: Current    Smokeless tobacco: Never    Tobacco comments:     Social smoking   Vaping Use    Vaping status: Former    Substances: Nicotine, Flavoring    Devices: Disposable    Passive vaping exposure: Yes   Substance and Sexual Activity    Alcohol use: Yes     Comment: occasionally    Drug use: Yes     Types: Marijuana     Comment: for pain management. Homemde THC oil    Sexual activity: Not Currently       Family History:  Family History "   Problem Relation Age of Onset    Diabetes Mother     Hypertension Mother     Atrial fibrillation Mother     Hypertension Father     Crohn's disease Father     Hypertension Maternal Grandmother     Hypertension Maternal Grandfather     Cancer Maternal Grandfather         SKIN CANCER    Hypertension Paternal Grandmother     Hypertension Paternal Grandfather     Heart disease Paternal Grandfather        Past Surgical History:  Past Surgical History:   Procedure Laterality Date    CHOLECYSTECTOMY      COLON RESECTION      COLONOSCOPY  09/02/2019    MOUTH SURGERY  08/02/2021       Problem List:  Patient Active Problem List   Diagnosis    Allergy-induced asthma, mild intermittent, uncomplicated    Ankylosing spondylitis    Back pain    Crohn's disease    Current every day smoker    Fatigue    Hypertension    Inflammatory arthritis    Rheumatoid arthritis    Long term current use of systemic steroids    Multiple sclerosis    Immunosuppressive disease    Obesity    Other long term (current) drug therapy    Pain of right hip joint    Radiculopathy    Seasonal allergies    Trochanteric bursitis of right hip    Urinary tract infection    Vitamin D deficiency    Bipolar 1 disorder, depressed, severe    PTSD (post-traumatic stress disorder)    Autism    Generalized anxiety disorder       Allergy:   Allergies   Allergen Reactions    Sulfa Antibiotics Hives        Discontinued Medications:  Medications Discontinued During This Encounter   Medication Reason    clonazePAM (KlonoPIN) 0.5 MG tablet Reorder         Current Medications:   Current Outpatient Medications   Medication Sig Dispense Refill    clonazePAM (KlonoPIN) 0.5 MG tablet Take 1 tablet by mouth Daily As Needed for Anxiety. 10 tablet 2    ACETAMINOPHEN ER PO Take 500 mg by mouth Every 6 (Six) Hours.      albuterol sulfate  (90 Base) MCG/ACT inhaler Inhale 2 puffs As Needed for Shortness of Air. 6.7 g 2    ePHEDrine HCl 12.5 MG tablet Take 1 tablet by mouth As  Needed.      multivitamin with minerals tablet tablet Take 1 tablet by mouth Daily.      sertraline (Zoloft) 50 MG tablet Take 1 tablet by mouth Daily. 30 tablet 0     No current facility-administered medications for this visit.         Review of Symptoms:    Psychiatric/Behavioral: Negative for agitation, behavioral problems, confusion, decreased concentration, dysphoric mood, hallucinations, self-injury, sleep disturbance and suicidal ideas. The patient is  Less depressed, still   nervous/anxious and is not hyperactive.        Physical Exam:   not currently breastfeeding.    Mental Status Exam:   Hygiene:   good  Cooperation:  Cooperative  Eye Contact:  Good  Psychomotor Behavior:  Appropriate  Affect:  Full range and Appropriate  Mood: anxious, irritable, and fluctates  Hopelessness: Denies  Speech:  Normal  Thought Process:  Goal directed and Linear  Thought Content:  Normal  Suicidal:  None  Homicidal:  None  Hallucinations:  None  Delusion:  None  Memory:   fair   Orientation:  Person, Place, Time and Situation  Reliability:  good  Insight:  Good  Judgement:  Good  Impulse Control:  Good  Physical/Medical Issues:  Yes        MSE from 7/25/24 reviewed and accepted with changes   PHQ-9 Depression Screening  Little interest or pleasure in doing things? Several days   Feeling down, depressed, or hopeless? Several days   PHQ-2 Total Score 2   Trouble falling or staying asleep, or sleeping too much? Over half   Feeling tired or having little energy? Several days   Poor appetite or overeating? Not at all   Feeling bad about yourself - or that you are a failure or have let yourself or your family down? Over half   Trouble concentrating on things, such as reading the newspaper or watching television? Almost all   Moving or speaking so slowly that other people could have noticed? Or the opposite - being so fidgety or restless that you have been moving around a lot more than usual? Not at all   Thoughts that you would be  better off dead, or of hurting yourself in some way? Not at all   PHQ-9 Total Score 10   If you checked off any problems, how difficult have these problems made it for you to do your work, take care of things at home, or get along with other people? Somewhat difficult    Over the last two weeks, how often have you been bothered by the following problems?  Feeling nervous, anxious or on edge: Nearly every day  Not being able to stop or control worrying: More than half the days  Worrying too much about different things: More than half the days  Trouble Relaxing: Several days  Being so restless that it is hard to sit still: Several days  Becoming easily annoyed or irritable: Several days  Feeling afraid as if something awful might happen: More than half the days  MIREILLE 7 Total Score: 12  If you checked any problems, how difficult have these problems made it for you to do your work, take care of things at home, or get along with other people: Extremely difficult       Current every day smoker 3-10 mintues spent counseling Has reduced [place] use    I advised Rachelle of the risks of tobacco use.     Lab Results:   No visits with results within 3 Month(s) from this visit.   Latest known visit with results is:   Office Visit on 10/24/2022   Component Date Value Ref Range Status    Hemoglobin A1C 10/24/2022 6.2 (H)  3.5 - 5.6 % Final    Glucose 10/24/2022 96  65 - 99 mg/dL Final    BUN 10/24/2022 6  6 - 20 mg/dL Final    Creatinine 10/24/2022 0.73  0.57 - 1.00 mg/dL Final    Sodium 10/24/2022 140  136 - 145 mmol/L Final    Potassium 10/24/2022 4.0  3.5 - 5.2 mmol/L Final    Chloride 10/24/2022 105  98 - 107 mmol/L Final    CO2 10/24/2022 23.3  22.0 - 29.0 mmol/L Final    Calcium 10/24/2022 9.3  8.6 - 10.5 mg/dL Final    Total Protein 10/24/2022 7.2  6.0 - 8.5 g/dL Final    Albumin 10/24/2022 4.20  3.50 - 5.20 g/dL Final    ALT (SGPT) 10/24/2022 23  1 - 33 U/L Final    AST (SGOT) 10/24/2022 19  1 - 32 U/L Final    Alkaline  Phosphatase 10/24/2022 63  39 - 117 U/L Final    Total Bilirubin 10/24/2022 0.2  0.0 - 1.2 mg/dL Final    Globulin 10/24/2022 3.0  gm/dL Final    A/G Ratio 10/24/2022 1.4  g/dL Final    BUN/Creatinine Ratio 10/24/2022 8.2  7.0 - 25.0 Final    Anion Gap 10/24/2022 11.7  5.0 - 15.0 mmol/L Final    eGFR 10/24/2022 103.5  >60.0 mL/min/1.73 Final    National Kidney Foundation and American Society of Nephrology (ASN) Task Force recommended calculation based on the Chronic Kidney Disease Epidemiology Collaboration (CKD-EPI) equation refit without adjustment for race.    Total Cholesterol 10/24/2022 169  0 - 200 mg/dL Final    Triglycerides 10/24/2022 93  0 - 150 mg/dL Final    HDL Cholesterol 10/24/2022 50  40 - 60 mg/dL Final    LDL Cholesterol  10/24/2022 102 (H)  0 - 100 mg/dL Final    VLDL Cholesterol 10/24/2022 17  5 - 40 mg/dL Final    LDL/HDL Ratio 10/24/2022 2.01   Final    TSH 10/24/2022 3.110  0.270 - 4.200 uIU/mL Final    Sed Rate 10/24/2022 44 (H)  0 - 20 mm/hr Final    WBC 10/24/2022 7.26  3.40 - 10.80 10*3/mm3 Final    RBC 10/24/2022 4.12  3.77 - 5.28 10*6/mm3 Final    Hemoglobin 10/24/2022 12.7  12.0 - 15.9 g/dL Final    Hematocrit 10/24/2022 37.5  34.0 - 46.6 % Final    MCV 10/24/2022 91.0  79.0 - 97.0 fL Final    MCH 10/24/2022 30.8  26.6 - 33.0 pg Final    MCHC 10/24/2022 33.9  31.5 - 35.7 g/dL Final    RDW 10/24/2022 12.9  12.3 - 15.4 % Final    RDW-SD 10/24/2022 42.4  37.0 - 54.0 fl Final    MPV 10/24/2022 9.4  6.0 - 12.0 fL Final    Platelets 10/24/2022 390  140 - 450 10*3/mm3 Final    Neutrophil % 10/24/2022 57.6  42.7 - 76.0 % Final    Lymphocyte % 10/24/2022 22.5  19.6 - 45.3 % Final    Monocyte % 10/24/2022 10.3  5.0 - 12.0 % Final    Eosinophil % 10/24/2022 7.9 (H)  0.3 - 6.2 % Final    Basophil % 10/24/2022 1.4  0.0 - 1.5 % Final    Immature Grans % 10/24/2022 0.3  0.0 - 0.5 % Final    Neutrophils, Absolute 10/24/2022 4.19  1.70 - 7.00 10*3/mm3 Final    Lymphocytes, Absolute 10/24/2022 1.63  0.70  - 3.10 10*3/mm3 Final    Monocytes, Absolute 10/24/2022 0.75  0.10 - 0.90 10*3/mm3 Final    Eosinophils, Absolute 10/24/2022 0.57 (H)  0.00 - 0.40 10*3/mm3 Final    Basophils, Absolute 10/24/2022 0.10  0.00 - 0.20 10*3/mm3 Final    Immature Grans, Absolute 10/24/2022 0.02  0.00 - 0.05 10*3/mm3 Final    nRBC 10/24/2022 0.0  0.0 - 0.2 /100 WBC Final       Assessment & Plan   Problems Addressed this Visit       Bipolar 1 disorder, depressed, severe - Primary (Chronic)    Relevant Medications    sertraline (Zoloft) 50 MG tablet    PTSD (post-traumatic stress disorder) (Chronic)    Relevant Medications    sertraline (Zoloft) 50 MG tablet    Generalized anxiety disorder (Chronic)    Relevant Medications    clonazePAM (KlonoPIN) 0.5 MG tablet    sertraline (Zoloft) 50 MG tablet     Diagnoses         Codes Comments    Bipolar 1 disorder, depressed, severe    -  Primary ICD-10-CM: F31.4  ICD-9-CM: 296.53     Generalized anxiety disorder     ICD-10-CM: F41.1  ICD-9-CM: 300.02     PTSD (post-traumatic stress disorder)     ICD-10-CM: F43.10  ICD-9-CM: 309.81             Visit Diagnoses:    ICD-10-CM ICD-9-CM   1. Bipolar 1 disorder, depressed, severe  F31.4 296.53   2. Generalized anxiety disorder  F41.1 300.02   3. PTSD (post-traumatic stress disorder)  F43.10 309.81           TREATMENT PLAN/GOALS: Continue supportive psychotherapy efforts and medications as indicated. Treatment and medication options discussed during today's visit. Patient ackowledged and verbally consented to continue with current treatment plan and was educated on the importance of compliance with treatment and follow-up appointments.    MEDICATION ISSUES:  1. Bipolar d/o - the pt reported feeling stable without meds     2. PTSD - coping skills   3. MIREILLE - start sertraline 25-50 mg , cont  low dose of clonazepam 0.5 mg po QD PRN for anxiety when she needs to go out for medical procedures and doctors visits      INSPECT reviewed as expected - gabapentin on  5/9/24 for 7 days   Clonazepam 9/14/24 # 10   UDS (dec 2, 2020)  + benzo (consistent) and + THC >300   UDS 7/25/24 - consistent   LABORATORY - SCAN - ABBREVIATED URINE DRUG SCREEN, [place]UNG, 07/25/2024 (07/25/2024)     PHQ scored  10  and indicated moderate depression   MIREILLE 7  scored 12 moderate anxiety     Patient screened positive for depression based on a PHQ-9 score of 10 on 10/24/2024. Follow-up recommendations include:  just coping skills       The pt was referred to psychologist for diagnostic clarification   Referral list provided     Discussed medication options and treatment plan of prescribed medication as well as the risks, benefits, and side effects including potential falls, possible impaired driving and metabolic adversities among others. Patient is agreeable to call the office with any worsening of symptoms or onset of side effects. Patient is agreeable to call 911 or go to the nearest ER should he/she begin having SI/HI. No medication side effects or related complaints today.     MEDS ORDERED DURING VISIT:  New Medications Ordered This Visit   Medications    clonazePAM (KlonoPIN) 0.5 MG tablet     Sig: Take 1 tablet by mouth Daily As Needed for Anxiety.     Dispense:  10 tablet     Refill:  2    sertraline (Zoloft) 50 MG tablet     Sig: Take 1 tablet by mouth Daily.     Dispense:  30 tablet     Refill:  0       Return in about 4 months (around 2/24/2025).          This document has been electronically signed by Roxann Johnson MD  October 24, 2024 10:54 EDT

## 2024-10-24 NOTE — TELEPHONE ENCOUNTER
clonazePAM (KlonoPIN) 0.5 MG tablet        Sig: Take 1 tablet by mouth Daily As Needed for Anxiety.        Sent to pharmacy as: clonazePAM 0.5 MG Oral Tablet (KlonoPIN)        Class: Normal        Route: Oral        E-Prescribing Status: Receipt confirmed by pharmacy (10/24/2024 10:49 AM EDT)

## 2024-11-08 ENCOUNTER — OFFICE VISIT (OUTPATIENT)
Dept: FAMILY MEDICINE CLINIC | Facility: CLINIC | Age: 49
End: 2024-11-08
Payer: MEDICAID

## 2024-11-08 VITALS
DIASTOLIC BLOOD PRESSURE: 80 MMHG | SYSTOLIC BLOOD PRESSURE: 140 MMHG | HEART RATE: 101 BPM | RESPIRATION RATE: 16 BRPM | HEIGHT: 72 IN | BODY MASS INDEX: 28.17 KG/M2 | TEMPERATURE: 98 F | WEIGHT: 208 LBS | OXYGEN SATURATION: 99 %

## 2024-11-08 DIAGNOSIS — G89.29 CHRONIC LEFT SHOULDER PAIN: ICD-10-CM

## 2024-11-08 DIAGNOSIS — Z13.1 SCREENING FOR DIABETES MELLITUS: ICD-10-CM

## 2024-11-08 DIAGNOSIS — Z13.29 SCREENING FOR ENDOCRINE, METABOLIC AND IMMUNITY DISORDER: ICD-10-CM

## 2024-11-08 DIAGNOSIS — R20.0 LEG NUMBNESS: ICD-10-CM

## 2024-11-08 DIAGNOSIS — F31.4 BIPOLAR 1 DISORDER, DEPRESSED, SEVERE: Primary | Chronic | ICD-10-CM

## 2024-11-08 DIAGNOSIS — Z12.31 ENCOUNTER FOR SCREENING MAMMOGRAM FOR MALIGNANT NEOPLASM OF BREAST: ICD-10-CM

## 2024-11-08 DIAGNOSIS — G89.29 CHRONIC PAIN OF LEFT KNEE: ICD-10-CM

## 2024-11-08 DIAGNOSIS — Z13.29 SCREENING FOR THYROID DISORDER: ICD-10-CM

## 2024-11-08 DIAGNOSIS — Z13.220 SCREENING FOR LIPID DISORDERS: ICD-10-CM

## 2024-11-08 DIAGNOSIS — M25.562 CHRONIC PAIN OF LEFT KNEE: ICD-10-CM

## 2024-11-08 DIAGNOSIS — Z13.0 SCREENING FOR ENDOCRINE, METABOLIC AND IMMUNITY DISORDER: ICD-10-CM

## 2024-11-08 DIAGNOSIS — Z12.11 SCREENING FOR MALIGNANT NEOPLASM OF COLON: ICD-10-CM

## 2024-11-08 DIAGNOSIS — M25.512 CHRONIC LEFT SHOULDER PAIN: ICD-10-CM

## 2024-11-08 DIAGNOSIS — Z13.228 SCREENING FOR ENDOCRINE, METABOLIC AND IMMUNITY DISORDER: ICD-10-CM

## 2024-11-08 LAB
BASOPHILS # BLD AUTO: 0.11 10*3/MM3 (ref 0–0.2)
BASOPHILS NFR BLD AUTO: 1.3 % (ref 0–1.5)
DEPRECATED RDW RBC AUTO: 44.4 FL (ref 37–54)
EOSINOPHIL # BLD AUTO: 0.81 10*3/MM3 (ref 0–0.4)
EOSINOPHIL NFR BLD AUTO: 9.7 % (ref 0.3–6.2)
ERYTHROCYTE [DISTWIDTH] IN BLOOD BY AUTOMATED COUNT: 13.6 % (ref 12.3–15.4)
HCT VFR BLD AUTO: 35.7 % (ref 34–46.6)
HGB BLD-MCNC: 11.8 G/DL (ref 12–15.9)
IMM GRANULOCYTES # BLD AUTO: 0.03 10*3/MM3 (ref 0–0.05)
IMM GRANULOCYTES NFR BLD AUTO: 0.4 % (ref 0–0.5)
LYMPHOCYTES # BLD AUTO: 1.67 10*3/MM3 (ref 0.7–3.1)
LYMPHOCYTES NFR BLD AUTO: 20 % (ref 19.6–45.3)
MCH RBC QN AUTO: 29.5 PG (ref 26.6–33)
MCHC RBC AUTO-ENTMCNC: 33.1 G/DL (ref 31.5–35.7)
MCV RBC AUTO: 89.3 FL (ref 79–97)
MONOCYTES # BLD AUTO: 0.67 10*3/MM3 (ref 0.1–0.9)
MONOCYTES NFR BLD AUTO: 8 % (ref 5–12)
NEUTROPHILS NFR BLD AUTO: 5.07 10*3/MM3 (ref 1.7–7)
NEUTROPHILS NFR BLD AUTO: 60.6 % (ref 42.7–76)
NRBC BLD AUTO-RTO: 0 /100 WBC (ref 0–0.2)
PLATELET # BLD AUTO: 370 10*3/MM3 (ref 140–450)
PMV BLD AUTO: 9.3 FL (ref 6–12)
RBC # BLD AUTO: 4 10*6/MM3 (ref 3.77–5.28)
WBC NRBC COR # BLD AUTO: 8.36 10*3/MM3 (ref 3.4–10.8)

## 2024-11-08 PROCEDURE — 80061 LIPID PANEL: CPT | Performed by: REGISTERED NURSE

## 2024-11-08 PROCEDURE — 99215 OFFICE O/P EST HI 40 MIN: CPT | Performed by: REGISTERED NURSE

## 2024-11-08 PROCEDURE — 1160F RVW MEDS BY RX/DR IN RCRD: CPT | Performed by: REGISTERED NURSE

## 2024-11-08 PROCEDURE — 3079F DIAST BP 80-89 MM HG: CPT | Performed by: REGISTERED NURSE

## 2024-11-08 PROCEDURE — 1125F AMNT PAIN NOTED PAIN PRSNT: CPT | Performed by: REGISTERED NURSE

## 2024-11-08 PROCEDURE — 83036 HEMOGLOBIN GLYCOSYLATED A1C: CPT | Performed by: REGISTERED NURSE

## 2024-11-08 PROCEDURE — 3077F SYST BP >= 140 MM HG: CPT | Performed by: REGISTERED NURSE

## 2024-11-08 PROCEDURE — T1015 CLINIC SERVICE: HCPCS | Performed by: REGISTERED NURSE

## 2024-11-08 PROCEDURE — 80050 GENERAL HEALTH PANEL: CPT | Performed by: REGISTERED NURSE

## 2024-11-08 PROCEDURE — 1159F MED LIST DOCD IN RCRD: CPT | Performed by: REGISTERED NURSE

## 2024-11-08 RX ORDER — GABAPENTIN 100 MG/1
100 CAPSULE ORAL 3 TIMES DAILY
Qty: 90 CAPSULE | Refills: 1 | Status: SHIPPED | OUTPATIENT
Start: 2024-11-08

## 2024-11-08 NOTE — PROGRESS NOTES
Chief Complaint  Follow-up and Depression    Subjective    History of Present Illness {CC  Problem List  Visit  Diagnosis   Encounters  Notes  Medications  Labs  Result Review Imaging  Media :23}     Rachelle Hummel presents to [facility] for Follow-up and Depression.      History of Present Illness  Patient is a 48 y.o. other who presents to the clinic today for 6-month follow-up for depression with anxiety, with concerns of left shoulder pain greater than 1 year and worsening left knee pain greater than 1 year.  Patient denies any chest pain, shortness of breath, or any fevers.  Patient denies any known exposure to COVID, flu, or any other contagious illnesses.       History of Present Illness  In regards to depression with anxiety, patient is currently seeing behavioral health for medication management of this.  She reports that she has been diagnosed with complex PTSD and social anxiety disorder, stemming from her experiences as a pediatric Crohn's patient and her work with the [employer]. She has been in a state of heightened alertness since early Wednesday morning. Her appetite has been poor since Tuesday, reporting that she continues to take Klonopin 0.5 mg twice daily and Zoloft 50 mg as she is prescribed by her mental health provider.  Patient denies any homicidal or suicidal ideations today.  She will continue going to her mental health provider for medication control.    In regards to left shoulder pain, patient reports that she is dealing with a shoulder injury from a previous incident involving a rescue tool, which has been exacerbated by [place]. She has been using a heating pad and elevating her knee to manage the pain. She is reluctant to return to a pain management clinic due to a negative experience in [place]. She takes Advil sparingly, about once a week, when the pain becomes unbearable. She was previously taking it  daily until she experienced a GI bleed.    In regards to the left knee pain, imaging done on her knee in the past did not reveal any issues. She occasionally experiences sciatica and numbness in her left leg. Her left ankle started swelling up about 3 to 4 weeks ago. She felt a red, warm knot and can still see a little bit of it there she reports. She feels it is in the tendon and it is intermittently painful. She fell in 06/2024 or 05/2024 and landed on her left knee. It has been giving her more problems lately, especially with the cold weather starting up. She cannot take NSAIDs because of her Crohn's history. She has been on gabapentin 300 mg 3 times a day for neuropathy in the past and is interested in restarting gabapentin today for pain control.  We discussed starting on a very low-dose and potentially increasing at follow-up visits if her pain is not well-controlled.     SOCIAL HISTORY  She used to work for the [employer]. 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 She lives [place] a farm.       Review of Systems   Constitutional: Negative.  Negative for activity change, chills and fatigue.   HENT: Negative.  Negative for congestion, dental problem, ear pain, hearing loss, rhinorrhea, sinus pain, sore throat, tinnitus and trouble swallowing.    Eyes: Negative.  Negative for pain and visual disturbance.   Respiratory: Negative.  Negative for cough, chest tightness, shortness of breath and wheezing.    Cardiovascular: Negative.  Negative for chest pain, palpitations and leg swelling.   Gastrointestinal: Negative.  Negative for abdominal pain, diarrhea, nausea and vomiting.   Endocrine: Negative.  Negative for polydipsia, polyphagia and polyuria.   Genitourinary: Negative.  Negative for difficulty urinating, dysuria, frequency and urgency.   Musculoskeletal: Negative.  Negative for arthralgias, back pain and myalgias.   Skin: Negative.  Negative for color change, pallor, rash and wound.  "  Allergic/Immunologic: Negative.  Negative for environmental allergies.   Neurological: Negative.  Negative for dizziness, speech difficulty, weakness, light-headedness and headaches.   Hematological: Negative.    Psychiatric/Behavioral: Negative.  Negative for confusion, decreased concentration, self-injury and suicidal ideas. The patient is not nervous/anxious.    All other systems reviewed and are negative.       Objective     Vital Signs:   /80   Pulse 101   Temp 98 °F (36.7 °C) (Temporal)   Resp 16   Ht 185.4 cm (73\")   Wt 94.3 kg (208 lb)   SpO2 99%   BMI 27.44 kg/m²   Current Outpatient Medications on File Prior to Visit   Medication Sig Dispense Refill    ACETAMINOPHEN ER PO Take 500 mg by mouth Every 6 (Six) Hours.      albuterol sulfate  (90 Base) MCG/ACT inhaler Inhale 2 puffs As Needed for Shortness of Air. 6.7 g 2    clonazePAM (KlonoPIN) 0.5 MG tablet Take 1 tablet by mouth Daily As Needed for Anxiety. 10 tablet 2    ePHEDrine HCl 12.5 MG tablet Take 1 tablet by mouth As Needed.      multivitamin with minerals tablet tablet Take 1 tablet by mouth Daily.      sertraline (Zoloft) 50 MG tablet Take 1 tablet by mouth Daily. 30 tablet 0     No current facility-administered medications on file prior to visit.        Past Medical History:   Diagnosis Date    Allergic     Ankylosing spondylitis of site in spine     Anorexia nervosa     Arthritis of back     Asthma     Autism spectrum disorder     Bursitis of hip     Colon polyp     Crohn disease     Crohn disease     Depression     GERD (gastroesophageal reflux disease)     Headache     Hip arthrosis     HTN (hypertension)     Irritable bowel syndrome     Kidney stone     Low back pain     MS (multiple sclerosis)     Obesity     Pneumonia     PTSD (post-traumatic stress disorder)     Rheumatoid arthritis     Vitamin D deficiency       Past Surgical History:   Procedure Laterality Date    CHOLECYSTECTOMY      COLON RESECTION      " COLONOSCOPY  09/02/2019    MOUTH SURGERY  08/02/2021      Family History   Problem Relation Age of Onset    Diabetes Mother     Hypertension Mother     Atrial fibrillation Mother     Hypertension Father     Crohn's disease Father     Hypertension Maternal Grandmother     Hypertension Maternal Grandfather     Cancer Maternal Grandfather         SKIN CANCER    Hypertension Paternal Grandmother     Hypertension Paternal Grandfather     Heart disease Paternal Grandfather       Social History     Socioeconomic History    Marital status:      Spouse name: Juan Cline   Tobacco Use    Smoking status: Light Smoker     Current packs/day: 0.25     Average packs/day: 0.3 packs/day for 12.8 years (3.2 ttl pk-yrs)     Types: Cigarettes     Start date: 2/1/2012     Passive exposure: Current    Smokeless tobacco: Never    Tobacco comments:     Social smoking   Vaping Use    Vaping status: Former    Substances: Nicotine, Flavoring    Devices: Disposable    Passive vaping exposure: Yes   Substance and Sexual Activity    Alcohol use: Yes     Comment: occasionally    Drug use: Yes     Types: Marijuana     Comment: for pain management. Homemde THC oil    Sexual activity: Not Currently         Office Visit on 11/08/2024   Component Date Value Ref Range Status    Glucose 11/08/2024 94  65 - 99 mg/dL Final    BUN 11/08/2024 5 (L)  6 - 20 mg/dL Final    Creatinine 11/08/2024 0.70  0.57 - 1.00 mg/dL Final    Sodium 11/08/2024 136  136 - 145 mmol/L Final    Potassium 11/08/2024 3.5  3.5 - 5.2 mmol/L Final    Chloride 11/08/2024 102  98 - 107 mmol/L Final    CO2 11/08/2024 21.4 (L)  22.0 - 29.0 mmol/L Final    Calcium 11/08/2024 9.1  8.6 - 10.5 mg/dL Final    Total Protein 11/08/2024 7.2  6.0 - 8.5 g/dL Final    Albumin 11/08/2024 4.0  3.5 - 5.2 g/dL Final    ALT (SGPT) 11/08/2024 12  1 - 33 U/L Final    AST (SGOT) 11/08/2024 11  1 - 32 U/L Final    Alkaline Phosphatase 11/08/2024 76  39 - 117 U/L Final    Total Bilirubin 11/08/2024  <0.2  0.0 - 1.2 mg/dL Final    Globulin 11/08/2024 3.2  gm/dL Final    A/G Ratio 11/08/2024 1.3  g/dL Final    BUN/Creatinine Ratio 11/08/2024 7.1  7.0 - 25.0 Final    Anion Gap 11/08/2024 12.6  5.0 - 15.0 mmol/L Final    eGFR 11/08/2024 107.5  >60.0 mL/min/1.73 Final    Total Cholesterol 11/08/2024 176  0 - 200 mg/dL Final    Triglycerides 11/08/2024 163 (H)  0 - 150 mg/dL Final    HDL Cholesterol 11/08/2024 47  40 - 60 mg/dL Final    LDL Cholesterol  11/08/2024 101 (H)  0 - 100 mg/dL Final    VLDL Cholesterol 11/08/2024 28  5 - 40 mg/dL Final    LDL/HDL Ratio 11/08/2024 2.05   Final    Hemoglobin A1C 11/08/2024 6.00 (H)  4.80 - 5.60 % Final    TSH 11/08/2024 3.440  0.270 - 4.200 uIU/mL Final    WBC 11/08/2024 8.36  3.40 - 10.80 10*3/mm3 Final    RBC 11/08/2024 4.00  3.77 - 5.28 10*6/mm3 Final    Hemoglobin 11/08/2024 11.8 (L)  12.0 - 15.9 g/dL Final    Hematocrit 11/08/2024 35.7  34.0 - 46.6 % Final    MCV 11/08/2024 89.3  79.0 - 97.0 fL Final    MCH 11/08/2024 29.5  26.6 - 33.0 pg Final    MCHC 11/08/2024 33.1  31.5 - 35.7 g/dL Final    RDW 11/08/2024 13.6  12.3 - 15.4 % Final    RDW-SD 11/08/2024 44.4  37.0 - 54.0 fl Final    MPV 11/08/2024 9.3  6.0 - 12.0 fL Final    Platelets 11/08/2024 370  140 - 450 10*3/mm3 Final    Neutrophil % 11/08/2024 60.6  42.7 - 76.0 % Final    Lymphocyte % 11/08/2024 20.0  19.6 - 45.3 % Final    Monocyte % 11/08/2024 8.0  5.0 - 12.0 % Final    Eosinophil % 11/08/2024 9.7 (H)  0.3 - 6.2 % Final    Basophil % 11/08/2024 1.3  0.0 - 1.5 % Final    Immature Grans % 11/08/2024 0.4  0.0 - 0.5 % Final    Neutrophils, Absolute 11/08/2024 5.07  1.70 - 7.00 10*3/mm3 Final    Lymphocytes, Absolute 11/08/2024 1.67  0.70 - 3.10 10*3/mm3 Final    Monocytes, Absolute 11/08/2024 0.67  0.10 - 0.90 10*3/mm3 Final    Eosinophils, Absolute 11/08/2024 0.81 (H)  0.00 - 0.40 10*3/mm3 Final    Basophils, Absolute 11/08/2024 0.11  0.00 - 0.20 10*3/mm3 Final    Immature Grans, Absolute 11/08/2024 0.03  0.00 -  0.05 10*3/mm3 Final    [facility] 11/08/2024 0.0  0.0 - 0.2 /100 WBC Final         Physical Exam  Vitals and nursing note reviewed.   Constitutional:       Appearance: Normal appearance. Rachelle is normal weight.   HENT:      Head: Normocephalic and atraumatic.   Cardiovascular:      Rate and Rhythm: Normal rate and regular rhythm.      Pulses: Normal pulses.      Heart sounds: Normal heart sounds. No murmur heard.     No friction rub. No gallop.   Pulmonary:      Effort: Pulmonary effort is normal. No respiratory distress.      Breath sounds: Normal breath sounds. No stridor. No wheezing, rhonchi or rales.   Chest:      Chest wall: No tenderness.   Abdominal:      General: Abdomen is flat. Bowel sounds are normal. There is no distension.      Palpations: Abdomen is soft. There is no mass.      Tenderness: There is no abdominal tenderness. There is no right CVA tenderness, left CVA tenderness, guarding or rebound.      Hernia: No hernia is present.   Skin:     General: Skin is warm and dry.      Capillary Refill: Capillary refill takes less than 2 seconds.      Coloration: Skin is not jaundiced or pale.   Neurological:      General: No focal deficit present.      Mental Status: Rachelle is alert and oriented to person, place, and time. Mental status is at baseline.      Motor: No weakness.      Coordination: Coordination normal.      Gait: Gait normal.   Psychiatric:         Mood and Affect: Mood normal.         Behavior: Behavior normal.         Thought Content: Thought content normal.         Judgment: Judgment normal.          Physical Exam         Result Review  Data Reviewed:{ Labs  Result Review  Imaging  Med Tab  Media :23}   I have reviewed this patient's chart.  I have reviewed previous labs, previous imaging, previous medications, and previous encounters with notes that were available in this patient's chart.    Results                Assessment and Plan {CC Problem List  Visit Diagnosis  ROS  Review  (Popup)  [facility]  BestPractice  Medications  SmartSets  SnapShot Encounters  Media :23}   Diagnoses and all orders for this visit:    1. Bipolar 1 disorder, depressed, severe (Primary)    2. Encounter for screening mammogram for malignant neoplasm of breast  -     Mammo Screening Digital Tomosynthesis Bilateral With CAD; Future    3. Chronic pain of left knee  -     gabapentin (NEURONTIN) 100 MG capsule; Take 1 capsule by mouth 3 (Three) Times a Day.  Dispense: 90 capsule; Refill: 1  -     MRI Knee Left Without Contrast; Future    4. Leg numbness  -     MRI Knee Left Without Contrast; Future    5. Chronic left shoulder pain  -     gabapentin (NEURONTIN) 100 MG capsule; Take 1 capsule by mouth 3 (Three) Times a Day.  Dispense: 90 capsule; Refill: 1  -     MRI Shoulder Left Without Contrast; Future    6. Screening for malignant neoplasm of colon  -     Cologuard - Stool, Per Rectum; Future    7. Screening for endocrine, metabolic and immunity disorder  -     CBC & Differential  -     Comprehensive Metabolic Panel    8. Screening for lipid disorders  -     Lipid Panel    9. Screening for diabetes mellitus  -     Hemoglobin A1c    10. Screening for thyroid disorder  -     TSH        Assessment & Plan  1. Depression.  She is currently on Zoloft 50 mg and Klonopin 0.5 mg twice a day. She should continue the plan of care that her behavioral health provider has discussed with her.  I will make no changes today and would recommend she continue following up with them.    2. Chronic left knee pain.  An MRI of the left knee has been ordered to evaluate the cause of her pain. Gabapentin 100 mg has been prescribed. If she tolerates the gabapentin well over the next 2 to 4 weeks, she should contact the office via [employer] message or schedule a visit to discuss increasing the dosage.    3. Chronic left shoulder pain.  An x-ray of the left shoulder has been ordered, followed by an MRI if necessary, to  evaluate the cause of her pain.    4. Sciatica.  She experiences occasional sciatica with numbness in the left leg. She is advised to use heat and rest as needed. She may take Advil sparingly, once a week, if the pain becomes unbearable.    5. Medication Management.  A prescription for gabapentin 100 mg has been sent to her pharmacy, [facility].    -ER red flags discussed with patient including risk versus benefit and education provided.  -Follow-up with me in 4 weeks or sooner if needed.    I spent 40 minutes caring for Rachelle on this date of service. This time includes time spent by me in the following activities:preparing for the visit, reviewing tests, obtaining and/or reviewing a separately obtained history, performing a medically appropriate examination and/or evaluation , counseling and educating the patient/family/caregiver, ordering medications, tests, or procedures, referring and communicating with other health care professionals , documenting information in the medical record, independently interpreting results and communicating that information with the patient/family/caregiver, and care coordination.    Follow Up {Instructions Charge Capture  Follow-up Communications :23}     Patient was given instructions and counseling regarding Merricks condition or for health maintenance advice. Please see specific information pulled into the AVS (placed there by myself) if appropriate.    Return in about 4 weeks (around 12/6/2024) for Recheck after starting gabapentin.    BMI is >= 25 and <30. (Overweight) The following options were offered after discussion;: exercise counseling/recommendations and nutrition counseling/recommendations         TELLO Solomon, [facility]      Patient or patient representative verbalized consent for the use of Ambient Listening during the visit with  TELLO Solomon for chart documentation. 11/11/2024  09:02 EST

## 2024-11-08 NOTE — PROGRESS NOTES
Venipuncture Blood Specimen Collection  Venipuncture performed in LT ARM by Young Zapata with good hemostasis. Patient tolerated the procedure well without complications.   11/08/24   Young Zapata

## 2024-11-09 DIAGNOSIS — R06.02 SHORTNESS OF BREATH: ICD-10-CM

## 2024-11-09 LAB
ALBUMIN SERPL-MCNC: 4 G/DL (ref 3.5–5.2)
ALBUMIN/GLOB SERPL: 1.3 G/DL
ALP SERPL-CCNC: 76 U/L (ref 39–117)
ALT SERPL W P-5'-P-CCNC: 12 U/L (ref 1–33)
ANION GAP SERPL CALCULATED.3IONS-SCNC: 12.6 MMOL/L (ref 5–15)
AST SERPL-CCNC: 11 U/L (ref 1–32)
BILIRUB SERPL-MCNC: <0.2 MG/DL (ref 0–1.2)
BUN SERPL-MCNC: 5 MG/DL (ref 6–20)
BUN/CREAT SERPL: 7.1 (ref 7–25)
CALCIUM SPEC-SCNC: 9.1 MG/DL (ref 8.6–10.5)
CHLORIDE SERPL-SCNC: 102 MMOL/L (ref 98–107)
CHOLEST SERPL-MCNC: 176 MG/DL (ref 0–200)
CO2 SERPL-SCNC: 21.4 MMOL/L (ref 22–29)
CREAT SERPL-MCNC: 0.7 MG/DL (ref 0.57–1)
EGFRCR SERPLBLD CKD-EPI 2021: 107.5 ML/MIN/1.73
GLOBULIN UR ELPH-MCNC: 3.2 GM/DL
GLUCOSE SERPL-MCNC: 94 MG/DL (ref 65–99)
HBA1C MFR BLD: 6 % (ref 4.8–5.6)
HDLC SERPL-MCNC: 47 MG/DL (ref 40–60)
LDLC SERPL CALC-MCNC: 101 MG/DL (ref 0–100)
LDLC/HDLC SERPL: 2.05 {RATIO}
POTASSIUM SERPL-SCNC: 3.5 MMOL/L (ref 3.5–5.2)
PROT SERPL-MCNC: 7.2 G/DL (ref 6–8.5)
SODIUM SERPL-SCNC: 136 MMOL/L (ref 136–145)
TRIGL SERPL-MCNC: 163 MG/DL (ref 0–150)
TSH SERPL DL<=0.05 MIU/L-ACNC: 3.44 UIU/ML (ref 0.27–4.2)
VLDLC SERPL-MCNC: 28 MG/DL (ref 5–40)

## 2024-11-11 RX ORDER — ALBUTEROL SULFATE 90 UG/1
2 INHALANT RESPIRATORY (INHALATION) AS NEEDED
Qty: 6.7 G | Refills: 2 | Status: SHIPPED | OUTPATIENT
Start: 2024-11-11

## 2024-11-18 ENCOUNTER — PRIOR AUTHORIZATION (OUTPATIENT)
Dept: PSYCHIATRY | Facility: CLINIC | Age: 49
End: 2024-11-18
Payer: MEDICAID

## 2024-11-18 NOTE — TELEPHONE ENCOUNTER
PA for clonazepam 0.5 mg tablets 10 for 10 days submitted to [employer].    Continuation.    Approved today by [employer] 2017  PA Case: [id number], Status: Approved, Coverage Starts on: 11/18/2024 12:00:00 AM, Coverage Ends on: 11/18/2025 12:00:00 AM.  Authorization Expiration Date: 11/17/2025

## 2024-11-28 DIAGNOSIS — F41.1 GENERALIZED ANXIETY DISORDER: Chronic | ICD-10-CM

## 2024-11-28 DIAGNOSIS — F43.10 PTSD (POST-TRAUMATIC STRESS DISORDER): Chronic | ICD-10-CM

## 2025-01-07 DIAGNOSIS — F41.1 GENERALIZED ANXIETY DISORDER: Chronic | ICD-10-CM

## 2025-01-07 DIAGNOSIS — G89.29 CHRONIC PAIN OF LEFT KNEE: ICD-10-CM

## 2025-01-07 DIAGNOSIS — M25.512 CHRONIC LEFT SHOULDER PAIN: ICD-10-CM

## 2025-01-07 DIAGNOSIS — M25.562 CHRONIC PAIN OF LEFT KNEE: ICD-10-CM

## 2025-01-07 DIAGNOSIS — G89.29 CHRONIC LEFT SHOULDER PAIN: ICD-10-CM

## 2025-01-07 RX ORDER — CLONAZEPAM 0.5 MG/1
0.5 TABLET ORAL DAILY PRN
Qty: 10 TABLET | Refills: 0 | OUTPATIENT
Start: 2025-01-07

## 2025-01-07 RX ORDER — CLONAZEPAM 0.5 MG/1
0.5 TABLET ORAL DAILY PRN
Qty: 10 TABLET | Refills: 1 | Status: SHIPPED | OUTPATIENT
Start: 2025-01-07

## 2025-01-07 NOTE — TELEPHONE ENCOUNTER
Rx Refill Note  Requested Prescriptions     Pending Prescriptions Disp Refills    clonazePAM (KlonoPIN) 0.5 MG tablet 10 tablet 2     Sig: Take 1 tablet by mouth Daily As Needed for Anxiety.      Last office visit with prescribing clinician: 10/24/2024   Last telemedicine visit with prescribing clinician: Visit date not found   Next office visit with prescribing clinician: 2/26/2025   Office Visit with Roxann Johnson MD (10/24/2024)   [place] Abbreviated Urine Drug Screen - (07/25/2024)                     Would you like a call back once the refill request has been completed: [] Yes [] No    If the office needs to give you a call back, can they leave a voicemail: [] Yes [] No    Luiza Good MA  01/07/25, 10:13 EST    LAST FILL 11-29-24; UPLOADED

## 2025-01-08 RX ORDER — GABAPENTIN 100 MG/1
100 CAPSULE ORAL 3 TIMES DAILY
Qty: 90 CAPSULE | Refills: 1 | OUTPATIENT
Start: 2025-01-08

## 2025-01-08 RX ORDER — GABAPENTIN 100 MG/1
100 CAPSULE ORAL 3 TIMES DAILY
Qty: 90 CAPSULE | Refills: 2 | Status: SHIPPED | OUTPATIENT
Start: 2025-01-08

## 2025-01-08 NOTE — TELEPHONE ENCOUNTER
Rx Refill Note  Requested Prescriptions     Pending Prescriptions Disp Refills    gabapentin (NEURONTIN) 100 MG capsule [Pharmacy Med Name: Gabapentin 100 MG Oral Capsule] 90 capsule 0     Sig: TAKE 1 CAPSULE BY MOUTH THREE TIMES DAILY      Last office visit with prescribing clinician: 11/8/2024   Last telemedicine visit with prescribing clinician: Visit date not found   Next office visit with prescribing clinician: 1/7/2025       UDS     12/03/2020  CSA      05/09/2024    INSPECT - SCAN - INSPECT/ [facility] [place]/ 01/08/2025 (01/08/2025)     Nguyen Stevens MA  01/08/25, 09:44 EST

## 2025-01-24 DIAGNOSIS — F41.1 GENERALIZED ANXIETY DISORDER: Chronic | ICD-10-CM

## 2025-01-24 DIAGNOSIS — F43.10 PTSD (POST-TRAUMATIC STRESS DISORDER): Chronic | ICD-10-CM

## 2025-01-24 RX ORDER — CLONAZEPAM 0.5 MG/1
0.5 TABLET ORAL DAILY PRN
Qty: 10 TABLET | Refills: 1 | Status: SHIPPED | OUTPATIENT
Start: 2025-01-24

## 2025-01-24 NOTE — TELEPHONE ENCOUNTER
Rx Refill Note  Requested Prescriptions     Pending Prescriptions Disp Refills    sertraline (ZOLOFT) 50 MG tablet 30 tablet 1     Sig: Take 1 tablet by mouth Daily.    clonazePAM (KlonoPIN) 0.5 MG tablet 10 tablet 1     Sig: Take 1 tablet by mouth Daily As Needed for Anxiety.      Last office visit with prescribing clinician: 10/24/2024   Last telemedicine visit with prescribing clinician: Visit date not found   Next office visit with prescribing clinician: 2/26/2025   Office Visit with Roxann Johnson MD (10/24/2024)   [place] Urine Drug Screen - (07/25/2024)                     Would you like a call back once the refill request has been completed: [] Yes [] No    If the office needs to give you a call back, can they leave a voicemail: [] Yes [] No    Luiza Good MA  01/24/25, 13:41 ESTINSPECT - SCAN - INSPECT/ [facility] [place]/ 01/08/2025 (01/08/2025) qty 10

## 2025-01-24 NOTE — TELEPHONE ENCOUNTER
Pt informed   Cyclosporine Counseling:  I discussed with the patient the risks of cyclosporine including but not limited to hypertension, gingival hyperplasia,myelosuppression, immunosuppression, liver damage, kidney damage, neurotoxicity, lymphoma, and serious infections. The patient understands that monitoring is required including baseline blood pressure, CBC, CMP, lipid panel and uric acid, and then 1-2 times monthly CMP and blood pressure.

## 2025-02-10 ENCOUNTER — TELEPHONE (OUTPATIENT)
Dept: FAMILY MEDICINE CLINIC | Facility: CLINIC | Age: 50
End: 2025-02-10

## 2025-02-10 NOTE — TELEPHONE ENCOUNTER
HUB to relay description below.    LVM FOR PT TO CALL OFFICE AND RS MICHELLE THAT'S ON FOR 02-10-25 SCHEDULE NEXT AVAILABLE  WITH JOSIE THANK YOU

## 2025-02-26 ENCOUNTER — OFFICE VISIT (OUTPATIENT)
Dept: PSYCHIATRY | Facility: CLINIC | Age: 50
End: 2025-02-26
Payer: MEDICAID

## 2025-02-26 DIAGNOSIS — F31.4 BIPOLAR 1 DISORDER, DEPRESSED, SEVERE: Primary | Chronic | ICD-10-CM

## 2025-02-26 DIAGNOSIS — F41.1 GENERALIZED ANXIETY DISORDER: Chronic | ICD-10-CM

## 2025-02-26 DIAGNOSIS — F43.10 PTSD (POST-TRAUMATIC STRESS DISORDER): Chronic | ICD-10-CM

## 2025-02-26 RX ORDER — CLONAZEPAM 0.5 MG/1
0.5 TABLET ORAL DAILY PRN
Qty: 10 TABLET | Refills: 3 | Status: SHIPPED | OUTPATIENT
Start: 2025-02-26

## 2025-02-26 RX ORDER — SERTRALINE HYDROCHLORIDE 100 MG/1
100 TABLET, FILM COATED ORAL DAILY
Qty: 90 TABLET | Refills: 1 | Status: SHIPPED | OUTPATIENT
Start: 2025-02-26

## 2025-02-26 NOTE — PROGRESS NOTES
"Subjective   Rachelle Hummel is a 49 y.o. adult who presents today for follow up     Chief Complaint:   anxiety, depression     History of Present Illness: the pt suffered from depression for many years,   Was on different meds, was stable few years ago on latuda, cymbalta, wellbutrin, viibryd.  Meds were prescribed by PCP.  Since then the pt had multiple medical procedures, Crohns disease since 11 yo, chronic pain    Last visit - the pt was started on sertraline     Today the pt reported feeling  anxious due to situation in the world,   The pt stays isolative to herself, does not leave her house, avoidance behavior   Still has hypervigilance   Depression/ sadness - not the issues recently, she has health issues and feels fatigued , E was always low    Anxiety intense and persistent, difficult to be around people     The pt  always had issues with concentration, ADHD suspected but never officially dsd , she was perfect student and \"was afraid not to be perfect \"      Did not get any mental health care until she was 16 yo  Traumatic childhood because of crohns disease, \"involuntary sigmoidoscopies\" at the age of 8 yo   The pt expressed interest in getting tested for autism and ADHD - referred to psychologicl evaluation,   not done yet but now she wants to address brain injury - recommended to go to [facility] brain [facility] program   The pt denied feeling depressed/hopeless/helpless,       She worries about different things, unable to relax, denied AVH/SI/HI   Triggers - negative news, david    Alleviating factors - to read , to spend time outdoor , crafts   Last manic episode - 4-5 years ago   Denied alc use  THC -- for pain management, recommended by PCP     The following portions of the patient's history were reviewed and updated as appropriate: allergies, current medications, past family history, past medical history, past social history, past surgical history and problem list.    PAST PSYCHIATRIC HISTORY  [place] " "I  Affective/Bipolar Disorder, Anxiety/Panic Disorder, Posttraumatic Stress  inpt in [place] 2 times,   Axis II  Defer     PAST OUTPATIENT TREATMENT  Diagnosis treated:  Affective Disorder, Anxiety/Panic Disorder, Post-Traumatic Stress  [facility] in the past,  Treatment Type:  Psychotherapy (EMDR) , Medication Management  Prior Psychiatric Medications:  zoloft -  \"better than nothing but did not address all problems\"   Elavil - not effective   Valium, Xanax  Effective   Buspar  - not effective   Hydroxyzine - not effective   Support Groups:  Not now   Sequelae Of Mental Disorder:  social isolation, emotional distress          Interval History  Increased anxiety (situational and health related)      Side Effects  Denied       Past Medical History:  Past Medical History:   Diagnosis Date    Allergic     Ankylosing spondylitis of site in spine     Anorexia nervosa     Arthritis of back     Asthma     Autism spectrum disorder     Bursitis of hip     Colon polyp     Crohn disease     Crohn disease     Depression     GERD (gastroesophageal reflux disease)     Headache     Hip arthrosis     HTN (hypertension)     Irritable bowel syndrome     Kidney stone     Low back pain     MS (multiple sclerosis)     Obesity     Pneumonia     PTSD (post-traumatic stress disorder)     Rheumatoid arthritis     Vitamin D deficiency        Social History:  Social History     Socioeconomic History    Marital status:      Spouse name: Juan Cline   Tobacco Use    Smoking status: Light Smoker     Current packs/day: 0.25     Average packs/day: 0.3 packs/day for 13.1 years (3.3 ttl pk-yrs)     Types: Cigarettes     Start date: 2/1/2012     Passive exposure: Current    Smokeless tobacco: Never    Tobacco comments:     Social smoking   Vaping Use    Vaping status: Former    Substances: Nicotine, Flavoring    Devices: Disposable    Passive vaping exposure: Yes   Substance and Sexual Activity    Alcohol use: Yes     Comment: occasionally    " Drug use: Yes     Types: Marijuana     Comment: for pain management. Homemde THC oil    Sexual activity: Not Currently       Family History:  Family History   Problem Relation Age of Onset    Diabetes Mother     Hypertension Mother     Atrial fibrillation Mother     Hypertension Father     Crohn's disease Father     Hypertension Maternal Grandmother     Hypertension Maternal Grandfather     Cancer Maternal Grandfather         SKIN CANCER    Hypertension Paternal Grandmother     Hypertension Paternal Grandfather     Heart disease Paternal Grandfather        Past Surgical History:  Past Surgical History:   Procedure Laterality Date    CHOLECYSTECTOMY      COLON RESECTION      COLONOSCOPY  09/02/2019    MOUTH SURGERY  08/02/2021       Problem List:  Patient Active Problem List   Diagnosis    Allergy-induced asthma, mild intermittent, uncomplicated    Ankylosing spondylitis    Back pain    Crohn's disease    Current every day smoker    Fatigue    Hypertension    Inflammatory arthritis    Rheumatoid arthritis    Long term current use of systemic steroids    Multiple sclerosis    Immunosuppressive disease    Obesity    Other long term (current) drug therapy    Pain of right hip joint    Radiculopathy    Seasonal allergies    Trochanteric bursitis of right hip    Urinary tract infection    Vitamin D deficiency    Bipolar 1 disorder, depressed, severe    PTSD (post-traumatic stress disorder)    Autism    Generalized anxiety disorder       Allergy:   Allergies   Allergen Reactions    Sulfa Antibiotics Hives        Discontinued Medications:  Medications Discontinued During This Encounter   Medication Reason    sertraline (ZOLOFT) 50 MG tablet Reorder    clonazePAM (KlonoPIN) 0.5 MG tablet Reorder           Current Medications:   Current Outpatient Medications   Medication Sig Dispense Refill    clonazePAM (KlonoPIN) 0.5 MG tablet Take 1 tablet by mouth Daily As Needed for Anxiety. 10 tablet 3    sertraline (ZOLOFT) 100 MG  tablet Take 1 tablet by mouth Daily. 90 tablet 1    ACETAMINOPHEN ER PO Take 500 mg by mouth Every 6 (Six) Hours.      albuterol sulfate  (90 Base) MCG/ACT inhaler Inhale 2 puffs As Needed for Shortness of Air. 6.7 g 2    ePHEDrine HCl 12.5 MG tablet Take 1 tablet by mouth As Needed.      gabapentin (NEURONTIN) 100 MG capsule TAKE 1 CAPSULE BY MOUTH THREE TIMES DAILY 90 capsule 2    multivitamin with minerals tablet tablet Take 1 tablet by mouth Daily.       No current facility-administered medications for this visit.         Review of Symptoms:    Psychiatric/Behavioral: Negative for agitation, behavioral problems, confusion, decreased concentration, dysphoric mood, hallucinations, self-injury, sleep disturbance and suicidal ideas. The patient is  Less depressed, still   nervous/anxious and is not hyperactive.        Physical Exam:   not currently breastfeeding.    Mental Status Exam:   Hygiene:   good  Cooperation:  Cooperative  Eye Contact:  Good  Psychomotor Behavior:  Appropriate  Affect:  Full range and Appropriate  Mood: anxious, irritable, and fluctates  Hopelessness: Denies  Speech:  Normal  Thought Process:  Goal directed and Linear  Thought Content:  Normal  Suicidal:  None  Homicidal:  None  Hallucinations:  None  Delusion:  None  Memory:   fair   Orientation:  Person, Place, Time and Situation  Reliability:  good  Insight:  Good  Judgement:  Good  Impulse Control:  Good  Physical/Medical Issues:  Yes        MSE from 10/24/24 reviewed and accepted without changes     PHQ-9 Depression Screening  Little interest or pleasure in doing things? Over half   Feeling down, depressed, or hopeless? Several days   PHQ-2 Total Score 3   Trouble falling or staying asleep, or sleeping too much? Several days   Feeling tired or having little energy? Almost all   Poor appetite or overeating? Several days   Feeling bad about yourself - or that you are a failure or have let yourself or your family down? Over half    Trouble concentrating on things, such as reading the newspaper or watching television? Over half   Moving or speaking so slowly that other people could have noticed? Or the opposite - being so fidgety or restless that you have been moving around a lot more than usual? Not at all   Thoughts that you would be better off dead, or of hurting yourself in some way? Not at all   PHQ-9 Total Score 12   If you checked off any problems, how difficult have these problems made it for you to do your work, take care of things at home, or get along with other people? Very difficult    Over the last two weeks, how often have you been bothered by the following problems?  Feeling nervous, anxious or on edge: Nearly every day  Not being able to stop or control worrying: More than half the days  Worrying too much about different things: More than half the days  Trouble Relaxing: Several days  Being so restless that it is hard to sit still: Several days  Becoming easily annoyed or irritable: More than half the days  Feeling afraid as if something awful might happen: More than half the days  MIREILLE 7 Total Score: 13  If you checked any problems, how difficult have these problems made it for you to do your work, take care of things at home, or get along with other people: Very difficult       Current every day smoker 3-10 mintues spent counseling Has reduced T[place]os use    I advised Rachelle of the risks of tobacco use.     Lab Results:   No visits with results within 3 Month(s) from this visit.   Latest known visit with results is:   Office Visit on 11/08/2024   Component Date Value Ref Range Status    Glucose 11/08/2024 94  65 - 99 mg/dL Final    BUN 11/08/2024 5 (L)  6 - 20 mg/dL Final    Creatinine 11/08/2024 0.70  0.57 - 1.00 mg/dL Final    Sodium 11/08/2024 136  136 - 145 mmol/L Final    Potassium 11/08/2024 3.5  3.5 - 5.2 mmol/L Final    Chloride 11/08/2024 102  98 - 107 mmol/L Final    CO2 11/08/2024 21.4 (L)  22.0 - 29.0 mmol/L  Final    Calcium 11/08/2024 9.1  8.6 - 10.5 mg/dL Final    Total Protein 11/08/2024 7.2  6.0 - 8.5 g/dL Final    Albumin 11/08/2024 4.0  3.5 - 5.2 g/dL Final    ALT (SGPT) 11/08/2024 12  1 - 33 U/L Final    AST (SGOT) 11/08/2024 11  1 - 32 U/L Final    Alkaline Phosphatase 11/08/2024 76  39 - 117 U/L Final    Total Bilirubin 11/08/2024 <0.2  0.0 - 1.2 mg/dL Final    Globulin 11/08/2024 3.2  gm/dL Final    A/G Ratio 11/08/2024 1.3  g/dL Final    BUN/Creatinine Ratio 11/08/2024 7.1  7.0 - 25.0 Final    Anion Gap 11/08/2024 12.6  5.0 - 15.0 mmol/L Final    eGFR 11/08/2024 107.5  >60.0 mL/min/1.73 Final    Total Cholesterol 11/08/2024 176  0 - 200 mg/dL Final    Triglycerides 11/08/2024 163 (H)  0 - 150 mg/dL Final    HDL Cholesterol 11/08/2024 47  40 - 60 mg/dL Final    LDL Cholesterol  11/08/2024 101 (H)  0 - 100 mg/dL Final    VLDL Cholesterol 11/08/2024 28  5 - 40 mg/dL Final    LDL/HDL Ratio 11/08/2024 2.05   Final    Hemoglobin A1C 11/08/2024 6.00 (H)  4.80 - 5.60 % Final    TSH 11/08/2024 3.440  0.270 - 4.200 uIU/mL Final    WBC 11/08/2024 8.36  3.40 - 10.80 10*3/mm3 Final    RBC 11/08/2024 4.00  3.77 - 5.28 10*6/mm3 Final    Hemoglobin 11/08/2024 11.8 (L)  12.0 - 15.9 g/dL Final    Hematocrit 11/08/2024 35.7  34.0 - 46.6 % Final    MCV 11/08/2024 89.3  79.0 - 97.0 fL Final    MCH 11/08/2024 29.5  26.6 - 33.0 pg Final    MCHC 11/08/2024 33.1  31.5 - 35.7 g/dL Final    RDW 11/08/2024 13.6  12.3 - 15.4 % Final    RDW-SD 11/08/2024 44.4  37.0 - 54.0 fl Final    MPV 11/08/2024 9.3  6.0 - 12.0 fL Final    Platelets 11/08/2024 370  140 - 450 10*3/mm3 Final    Neutrophil % 11/08/2024 60.6  42.7 - 76.0 % Final    Lymphocyte % 11/08/2024 20.0  19.6 - 45.3 % Final    Monocyte % 11/08/2024 8.0  5.0 - 12.0 % Final    Eosinophil % 11/08/2024 9.7 (H)  0.3 - 6.2 % Final    Basophil % 11/08/2024 1.3  0.0 - 1.5 % Final    Immature Grans % 11/08/2024 0.4  0.0 - 0.5 % Final    Neutrophils, Absolute 11/08/2024 5.07  1.70 - 7.00  10*3/mm3 Final    Lymphocytes, Absolute 11/08/2024 1.67  0.70 - 3.10 10*3/mm3 Final    Monocytes, Absolute 11/08/2024 0.67  0.10 - 0.90 10*3/mm3 Final    Eosinophils, Absolute 11/08/2024 0.81 (H)  0.00 - 0.40 10*3/mm3 Final    Basophils, Absolute 11/08/2024 0.11  0.00 - 0.20 10*3/mm3 Final    Immature Grans, Absolute 11/08/2024 0.03  0.00 - 0.05 10*3/mm3 Final    nRBC 11/08/2024 0.0  0.0 - 0.2 /100 WBC Final       Assessment & Plan   Problems Addressed this Visit       Bipolar 1 disorder, depressed, severe - Primary (Chronic)    Relevant Medications    sertraline (ZOLOFT) 100 MG tablet    PTSD (post-traumatic stress disorder) (Chronic)    Relevant Medications    sertraline (ZOLOFT) 100 MG tablet    Generalized anxiety disorder (Chronic)    Relevant Medications    sertraline (ZOLOFT) 100 MG tablet    clonazePAM (KlonoPIN) 0.5 MG tablet     Diagnoses         Codes Comments    Bipolar 1 disorder, depressed, severe    -  Primary ICD-10-CM: F31.4  ICD-9-CM: 296.53     Generalized anxiety disorder     ICD-10-CM: F41.1  ICD-9-CM: 300.02     PTSD (post-traumatic stress disorder)     ICD-10-CM: F43.10  ICD-9-CM: 309.81             Visit Diagnoses:    ICD-10-CM ICD-9-CM   1. Bipolar 1 disorder, depressed, severe  F31.4 296.53   2. Generalized anxiety disorder  F41.1 300.02   3. PTSD (post-traumatic stress disorder)  F43.10 309.81             TREATMENT PLAN/GOALS: Continue supportive psychotherapy efforts and medications as indicated. Treatment and medication options discussed during today's visit. Patient ackowledged and verbally consented to continue with current treatment plan and was educated on the importance of compliance with treatment and follow-up appointments.    MEDICATION ISSUES:  1. Bipolar d/o - the pt reported feeling stable without meds , the pt is on gabapentin for chronic pain and gabapentin is mild mood stabilizer     2. PTSD - coping skills   3. MIREILLE - cont sertraline , increase to 100 mg - the pt is closely  monitoring her mood , no sxs of sourav/hypomania observed by her friends/relatives  cont  low dose of clonazepam 0.5 mg po QD PRN for anxiety when she needs to go out for medical procedures and doctors visits      INSPECT reviewed as expected - gabapentin on 5/9/24 for 7 days   Clonazepam 2/6/25  # 10   UDS (dec 2, 2020)  + benzo (consistent) and + THC >300   UDS 7/25/24 - consistent   LABORATORY - SCAN - ABBREVIATED URINE DRUG SCREEN, KYUNG, 07/25/2024 (07/25/2024)     PHQ scored  12  and indicated moderate depression   MIREILLE 7  scored 13 moderate anxiety     Patient screened positive for depression based on a PHQ-9 score of 12 on 2/26/2025. Follow-up recommendations include:  just coping skills       The pt was going to see psychologist for diagnostic clarification , but did not make appt yet        Discussed medication options and treatment plan of prescribed medication as well as the risks, benefits, and side effects including potential falls, possible impaired driving and metabolic adversities among others. Patient is agreeable to call the office with any worsening of symptoms or onset of side effects. Patient is agreeable to call 911 or go to the nearest ER should he/she begin having SI/HI. No medication side effects or related complaints today.     MEDS ORDERED DURING VISIT:  New Medications Ordered This Visit   Medications    sertraline (ZOLOFT) 100 MG tablet     Sig: Take 1 tablet by mouth Daily.     Dispense:  90 tablet     Refill:  1    clonazePAM (KlonoPIN) 0.5 MG tablet     Sig: Take 1 tablet by mouth Daily As Needed for Anxiety.     Dispense:  10 tablet     Refill:  3       Return in about 6 months (around 8/26/2025).          This document has been electronically signed by Roxann Johnson MD  February 26, 2025 11:19 EST

## 2025-03-02 ENCOUNTER — HOSPITAL ENCOUNTER (EMERGENCY)
Facility: HOSPITAL | Age: 50
Discharge: HOME | End: 2025-03-02
Attending: EMERGENCY MEDICINE | Admitting: EMERGENCY MEDICINE
Payer: MEDICAID

## 2025-03-02 VITALS
BODY MASS INDEX: 28.19 KG/M2 | HEART RATE: 92 BPM | TEMPERATURE: 98 F | HEIGHT: 72 IN | RESPIRATION RATE: 16 BRPM | DIASTOLIC BLOOD PRESSURE: 84 MMHG | OXYGEN SATURATION: 100 % | SYSTOLIC BLOOD PRESSURE: 146 MMHG | WEIGHT: 208.11 LBS

## 2025-03-02 DIAGNOSIS — H66.001 NON-RECURRENT ACUTE SUPPURATIVE OTITIS MEDIA OF RIGHT EAR WITHOUT SPONTANEOUS RUPTURE OF TYMPANIC MEMBRANE: Primary | ICD-10-CM

## 2025-03-02 LAB — D-LACTATE SERPL-SCNC: 0.9 MMOL/L (ref 0.5–2)

## 2025-03-02 PROCEDURE — 25010000002 CEFTRIAXONE PER 250 MG: Performed by: EMERGENCY MEDICINE

## 2025-03-02 PROCEDURE — 96372 THER/PROPH/DIAG INJ SC/IM: CPT

## 2025-03-02 PROCEDURE — 83605 ASSAY OF LACTIC ACID: CPT

## 2025-03-02 PROCEDURE — 25010000002 LIDOCAINE PF 1% 1 % SOLUTION 2 ML VIAL: Performed by: EMERGENCY MEDICINE

## 2025-03-02 PROCEDURE — 63710000001 PREDNISONE PER 1 MG: Performed by: EMERGENCY MEDICINE

## 2025-03-02 PROCEDURE — 99283 EMERGENCY DEPT VISIT LOW MDM: CPT

## 2025-03-02 PROCEDURE — 63710000001 ONDANSETRON ODT 4 MG TABLET DISPERSIBLE: Performed by: EMERGENCY MEDICINE

## 2025-03-02 RX ORDER — HYDROCODONE BITARTRATE AND ACETAMINOPHEN 5; 325 MG/1; MG/1
1 TABLET ORAL ONCE
Status: COMPLETED | OUTPATIENT
Start: 2025-03-02 | End: 2025-03-02

## 2025-03-02 RX ORDER — PREDNISONE 20 MG/1
20 TABLET ORAL 2 TIMES DAILY
Qty: 10 TABLET | Refills: 0 | Status: SHIPPED | OUTPATIENT
Start: 2025-03-02 | End: 2025-03-07

## 2025-03-02 RX ORDER — ONDANSETRON 4 MG/1
4 TABLET, ORALLY DISINTEGRATING ORAL EVERY 8 HOURS PRN
Qty: 15 TABLET | Refills: 0 | Status: SHIPPED | OUTPATIENT
Start: 2025-03-02

## 2025-03-02 RX ORDER — PREDNISONE 20 MG/1
20 TABLET ORAL ONCE
Status: COMPLETED | OUTPATIENT
Start: 2025-03-02 | End: 2025-03-02

## 2025-03-02 RX ORDER — ONDANSETRON 4 MG/1
4 TABLET, ORALLY DISINTEGRATING ORAL ONCE
Status: COMPLETED | OUTPATIENT
Start: 2025-03-02 | End: 2025-03-02

## 2025-03-02 RX ADMIN — ONDANSETRON 4 MG: 4 TABLET, ORALLY DISINTEGRATING ORAL at 21:51

## 2025-03-02 RX ADMIN — AMOXICILLIN AND CLAVULANATE POTASSIUM 1 TABLET: 875; 125 TABLET, FILM COATED ORAL at 21:51

## 2025-03-02 RX ADMIN — HYDROCODONE BITARTRATE AND ACETAMINOPHEN 1 TABLET: 5; 325 TABLET ORAL at 22:08

## 2025-03-02 RX ADMIN — LIDOCAINE HYDROCHLORIDE 1 G: 10 INJECTION, SOLUTION EPIDURAL; INFILTRATION; INTRACAUDAL; PERINEURAL at 23:07

## 2025-03-02 RX ADMIN — PREDNISONE 20 MG: 20 TABLET ORAL at 21:51

## 2025-03-02 NOTE — Clinical Note
[facility] [place] [address] [place] [address] [place] [address]  Phone: [phone]    Rachelle Hummel was seen and treated in our emergency department on 3/2/2025.  Rachelle may return to work on 03/04/2025.         Thank you for choosing [facility].    Hal Antunez MD

## 2025-03-02 NOTE — Clinical Note
[facility] [place] [address] [place] [address] [place] [address]  Phone: [phone]    Rachelle Hummel was seen and treated in our emergency department on 3/2/2025.  Rachelle may return to work on 03/04/2025.         Thank you for choosing [facility].    Hal Antunez MD

## 2025-03-03 NOTE — FSED PROVIDER NOTE
Subjective   History of Present Illness    Patient is a 49-year-old woman who presents complaining of throat discomfort which began approximately 2 to 3 days prior to arrival and gradually worsening.  Patient then developed ear pain earlier today.  No ear drainage.  Patient has had runny nose nasal congestion.  Patient denies any vomiting or diarrhea but has developed some slight nausea.  She has had associated cough which she attributes to throat discomfort and throat congestion.  No fevers.    Review of Systems    Past Medical History:   Diagnosis Date    Allergic     Ankylosing spondylitis of site in spine     Anorexia nervosa     Arthritis of back     Asthma     Autism spectrum disorder     Bursitis of hip     Colon polyp     Crohn disease     Crohn disease     Depression     GERD (gastroesophageal reflux disease)     Headache     Hip arthrosis     HTN (hypertension)     Irritable bowel syndrome     Kidney stone     Low back pain     MS (multiple sclerosis)     Obesity     Pneumonia     PTSD (post-traumatic stress disorder)     Rheumatoid arthritis     Vitamin D deficiency        Allergies   Allergen Reactions    Sulfa Antibiotics Hives       Past Surgical History:   Procedure Laterality Date    CHOLECYSTECTOMY      COLON RESECTION      COLONOSCOPY  09/02/2019    MOUTH SURGERY  08/02/2021       Family History   Problem Relation Age of Onset    Diabetes Mother     Hypertension Mother     Atrial fibrillation Mother     Hypertension Father     Crohn's disease Father     Hypertension Maternal Grandmother     Hypertension Maternal Grandfather     Cancer Maternal Grandfather         SKIN CANCER    Hypertension Paternal Grandmother     Hypertension Paternal Grandfather     Heart disease Paternal Grandfather        Social History     Socioeconomic History    Marital status:      Spouse name: Juan Cline   Tobacco Use    Smoking status: Light Smoker     Current packs/day: 0.25     Average packs/day: 0.3  packs/day for 13.1 years (3.3 ttl pk-yrs)     Types: Cigarettes     Start date: 2/1/2012     Passive exposure: Current    Smokeless tobacco: Never    Tobacco comments:     Social smoking   Vaping Use    Vaping status: Former    Substances: Nicotine, Flavoring    Devices: Disposable    Passive vaping exposure: Yes   Substance and Sexual Activity    Alcohol use: Yes     Comment: occasionally    Drug use: Yes     Types: Marijuana     Comment: for pain management. Homemde THC oil    Sexual activity: Not Currently           Objective   Physical Exam  Vitals and nursing note reviewed.   Constitutional:       General: Rachelle is not in acute distress.     Appearance: Normal appearance. Rachelle is not ill-appearing or toxic-appearing.   HENT:      Head: Normocephalic and atraumatic.      Right Ear: Ear canal and external ear normal.      Left Ear: Tympanic membrane, ear canal and external ear normal.      Ears:      Comments: Serous otitis media to the right ear.  No tragal tenderness bilaterally.     Nose: Rhinorrhea present.      Mouth/Throat:      Mouth: Mucous membranes are moist.      Pharynx: Posterior oropharyngeal erythema present. No oropharyngeal exudate.      Comments: No pharyngeal exudates or swelling.  No evidence of peritonsillar abscess.  Eyes:      Extraocular Movements: Extraocular movements intact.   Cardiovascular:      Rate and Rhythm: Normal rate and regular rhythm.      Pulses: Normal pulses.      Heart sounds: Normal heart sounds.   Pulmonary:      Effort: Pulmonary effort is normal. No respiratory distress.      Breath sounds: Normal breath sounds.   Musculoskeletal:         General: Normal range of motion.      Cervical back: Normal range of motion and neck supple. No rigidity or tenderness.   Lymphadenopathy:      Cervical: No cervical adenopathy.   Skin:     General: Skin is warm and dry.      Capillary Refill: Capillary refill takes less than 2 seconds.   Neurological:      Mental Status: Rachelle  is alert.         Procedures           ED Course                                           Medical Decision Making  Problems Addressed:  Non-recurrent acute suppurative otitis media of right ear without spontaneous rupture of tympanic membrane: complicated acute illness or injury    Risk  Prescription drug management.    .  Patient is a 49-year-old woman who presents with 2 to 3 days of gradually worsening throat discomfort with throat congestion causing cough symptoms.  Patient has had associated nausea but no vomiting.  Patient otherwise tolerating solids and liquids.  Today the patient had developed right ear pain without any ear drainage.  On examination she appears well-nourished well-developed no acute distress.  She does have nasal rhinorrhea and otitis media to the right ear.  There is pharyngeal erythema without exudates or swelling.  No cervical lymphadenopathy.  Lungs are clear to auscultation.  Patient will be placed on Augmentin and prednisone for otitis media and nasal congestion symptoms.  Patient also requested prescription for Zofran which has been provided.  Patient will return if any concerns.  Patient informed the nurse that he was upset and she was wanting something for pain control for ear.  Norco was ordered.  Patient then was ready for discharge however was again upset that she felt she may be septic.  She does not have a fever here and respirations are 14.  She is tachycardic at 102 otherwise no other SIRS criteria.  She felt that her ear pain started quickly this evening and she has had sepsis in the past and wanted blood work done.  Had lengthy conversation the patient that at this time she does not appear septic.  A lactic acid will be ordered and the patient will be given IM Rocephin.  She does have a follow-up appoint with her primary provider in 2 or 3 days.    Lactic acid is 0.93.  Patient advised no evidence of sepsis at this time.  He is still requesting IM Rocephin.  This will be  provided the patient will be discharged to home.    Final diagnoses:   Non-recurrent acute suppurative otitis media of right ear without spontaneous rupture of tympanic membrane       ED Disposition  ED Disposition       ED Disposition   Discharge    Condition   Stable    Comment   --               Ariel Quintana, APRN  [address] [facility] [place] [address]  [phone]    In 1 week           Medication List        New Prescriptions      amoxicillin-clavulanate 875-125 MG per tablet  Commonly known as: AUGMENTIN  Take 1 tablet by mouth 2 (Two) Times a Day for 10 days.     ondansetron ODT 4 MG disintegrating tablet  Commonly known as: ZOFRAN-ODT  Place 1 tablet on the tongue Every 8 (Eight) Hours As Needed for Vomiting or Nausea.     predniSONE 20 MG tablet  Commonly known as: DELTASONE  Take 1 tablet by mouth 2 (Two) Times a Day for 5 days.               Where to Get Your Medications        These medications were sent to [facility] [address] [place] [address] [place] - [address] - [phone]  - [phone] FX  [address] [place] [address]      Phone: [phone]   amoxicillin-clavulanate 875-125 MG per tablet  ondansetron ODT 4 MG disintegrating tablet  predniSONE 20 MG tablet

## 2025-03-03 NOTE — DISCHARGE INSTRUCTIONS
Please take medications as prescribed.  Please follow-up with your provider.  Seek immediate medical attention if having any concerns.

## 2025-03-03 NOTE — ED NOTES
Went in to discharge patient, patient upset because no bloodwork was being done.  Explained to patient that she said she was here for a earache.  She stated she was also having photophobia and has a long history of chron's. Stated she also has a history of Sepsis.  Told patient that I would send the doctor back in.

## 2025-03-19 ENCOUNTER — OFFICE VISIT (OUTPATIENT)
Dept: FAMILY MEDICINE CLINIC | Facility: CLINIC | Age: 50
End: 2025-03-19
Payer: MEDICAID

## 2025-03-19 VITALS
SYSTOLIC BLOOD PRESSURE: 121 MMHG | BODY MASS INDEX: 28.17 KG/M2 | TEMPERATURE: 96.8 F | HEIGHT: 72 IN | WEIGHT: 208 LBS | RESPIRATION RATE: 18 BRPM | HEART RATE: 83 BPM | DIASTOLIC BLOOD PRESSURE: 83 MMHG | OXYGEN SATURATION: 94 %

## 2025-03-19 DIAGNOSIS — H92.09 OTALGIA, UNSPECIFIED LATERALITY: ICD-10-CM

## 2025-03-19 DIAGNOSIS — M25.562 CHRONIC PAIN OF LEFT KNEE: ICD-10-CM

## 2025-03-19 DIAGNOSIS — M25.512 CHRONIC LEFT SHOULDER PAIN: ICD-10-CM

## 2025-03-19 DIAGNOSIS — H66.002 ACUTE SUPPURATIVE OTITIS MEDIA OF LEFT EAR WITHOUT SPONTANEOUS RUPTURE OF TYMPANIC MEMBRANE, RECURRENCE NOT SPECIFIED: Primary | ICD-10-CM

## 2025-03-19 DIAGNOSIS — H93.8X3 CONGESTION OF BOTH EARS: ICD-10-CM

## 2025-03-19 DIAGNOSIS — G89.29 CHRONIC LEFT SHOULDER PAIN: ICD-10-CM

## 2025-03-19 DIAGNOSIS — H60.92 INFLAMMATION OF LEFT EXTERNAL EAR: ICD-10-CM

## 2025-03-19 DIAGNOSIS — J02.9 SORE THROAT: ICD-10-CM

## 2025-03-19 DIAGNOSIS — J34.89 RHINORRHEA: ICD-10-CM

## 2025-03-19 DIAGNOSIS — G89.29 CHRONIC PAIN OF LEFT KNEE: ICD-10-CM

## 2025-03-19 LAB
EXPIRATION DATE: NORMAL
FLUAV AG UPPER RESP QL IA.RAPID: NOT DETECTED
FLUBV AG UPPER RESP QL IA.RAPID: NOT DETECTED
INTERNAL CONTROL: NORMAL
Lab: NORMAL
SARS-COV-2 AG UPPER RESP QL IA.RAPID: NOT DETECTED

## 2025-03-19 RX ORDER — PREDNISONE 20 MG/1
20 TABLET ORAL DAILY
Qty: 7 TABLET | Refills: 0 | Status: SHIPPED | OUTPATIENT
Start: 2025-03-19

## 2025-03-19 RX ORDER — GABAPENTIN 300 MG/1
300 CAPSULE ORAL 3 TIMES DAILY
Qty: 90 CAPSULE | Refills: 1 | Status: SHIPPED | OUTPATIENT
Start: 2025-03-19

## 2025-03-19 NOTE — PROGRESS NOTES
Chief Complaint  Cough (Associated with sore throat, right ear pain, congestion with green mucus, chest congestion, headaches, chills.  She was seen at urgent care and received 10 day antibiotic for right ear infection.)    Subjective    History of Present Illness {CC  Problem List  Visit  Diagnosis   Encounters  Notes  Medications  Labs  Result Review Imaging  Media :23}     Rachelle Hummel presents to [facility] for Cough (Associated with sore throat, right ear pain, congestion with green mucus, chest congestion, headaches, chills.  She was seen at urgent care and received 10 day antibiotic for right ear infection.).      History of Present Illness  Patient is a 49 y.o. other who presents to the clinic today for 2-week follow-up for left ear infection and concerns of uncontrolled left shoulder and left knee pain greater than 1 year.  Patient denies any chest pain, shortness of breath, or any fevers.  Patient denies any known exposure to COVID, flu, or any other contagious illnesses.       History of Present Illness  In regards to left ear infection, patient has been experiencing symptoms for several weeks, initially seeking care at an urgent care facility on [address] [place] [address] in [place]. Prior to this visit, patient had been experiencing fevers ranging from 100 to 101 degrees and severe right ear pain, rated as 8 out of 10. She reports no current ear pain but mentions mild itchiness, which she attributes to her sore throat. She also notes that it is common for her to experience earaches during windy conditions, prompting her to use a head wrap for protection. The Augmentin effectively treated her ear infection; however, she subsequently developed sinus and throat symptoms, including significant drainage. These symptoms have persisted since discontinuing the Augmentin over the weekend. She has been consuming yogurt to mitigate potential diarrhea from antibiotic use. She has a  history of frequent sinus infections and reports that her current symptoms are more sinus-related. She has been consuming yogurt to mitigate potential diarrhea from antibiotic use.    In regards to left shoulder and left knee pain, she is currently taking gabapentin 100 mg to manage her chronic left shoulder and left knee pain. She was prescribed prednisone 40 mg for 5 days in the ER, which significantly improved her overall well-being and muscle pain.  Patient would like to increase this gabapentin as it provides significant relief for her shoulder and knee.  She also reports that it helps with mood stabilization.    Supplemental Information  She was diagnosed with Crohn's disease at the age of 10 and experienced a significant flare-up that resulted in an 80-pound weight loss over a 2-month period.    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    ALLERGIES  The patient has no known allergies.    MEDICATIONS  Current: Augmentin, Zoloft, gabapentin, prednisone       Review of Systems   Constitutional: Negative.  Positive for fatigue and fever. Negative for activity change and chills.   HENT: Negative.  Positive for congestion, ear pain, hearing loss, rhinorrhea and sore throat. Negative for dental problem, sinus pain, tinnitus and trouble swallowing.    Eyes: Negative.  Negative for pain and visual disturbance.   Respiratory: Negative.  Positive for cough. Negative for chest tightness, shortness of breath and wheezing.    Cardiovascular: Negative.  Negative for chest pain, palpitations and leg swelling.   Gastrointestinal: Negative.  Negative for abdominal pain, diarrhea, nausea and vomiting.   Endocrine: Negative.  Negative for polydipsia, polyphagia and polyuria.   Genitourinary: Negative.  Negative for difficulty urinating, dysuria, frequency and urgency.   Musculoskeletal: Negative.  Positive for arthralgias and back pain. Negative for myalgias.   Skin: Negative.   "Negative for color change, pallor, rash and wound.   Allergic/Immunologic: Negative.  Negative for environmental allergies.   Neurological: Negative.  Negative for dizziness, speech difficulty, weakness, light-headedness, numbness and headaches.   Hematological: Negative.    Psychiatric/Behavioral: Negative.  Negative for confusion, decreased concentration, self-injury and suicidal ideas. The patient is not nervous/anxious.    All other systems reviewed and are negative.       Objective     Vital Signs:   /83 (BP Location: Left arm, Patient Position: Sitting, Cuff Size: Adult)   Pulse 83   Temp 96.8 °F (36 °C) (Temporal)   Resp 18   Ht 185.4 cm (73\")   Wt 94.3 kg (208 lb)   SpO2 94%   BMI 27.44 kg/m²   Current Outpatient Medications on File Prior to Visit   Medication Sig Dispense Refill    ACETAMINOPHEN ER PO Take 500 mg by mouth Every 6 (Six) Hours.      albuterol sulfate  (90 Base) MCG/ACT inhaler Inhale 2 puffs As Needed for Shortness of Air. 6.7 g 2    clonazePAM (KlonoPIN) 0.5 MG tablet Take 1 tablet by mouth Daily As Needed for Anxiety. 10 tablet 3    ePHEDrine HCl 12.5 MG tablet Take 1 tablet by mouth As Needed.      multivitamin with minerals tablet tablet Take 1 tablet by mouth Daily.      ondansetron ODT (ZOFRAN-ODT) 4 MG disintegrating tablet Place 1 tablet on the tongue Every 8 (Eight) Hours As Needed for Vomiting or Nausea. 15 tablet 0    sertraline (ZOLOFT) 100 MG tablet Take 1 tablet by mouth Daily. 90 tablet 1    [DISCONTINUED] gabapentin (NEURONTIN) 100 MG capsule TAKE 1 CAPSULE BY MOUTH THREE TIMES DAILY 90 capsule 2     No current facility-administered medications on file prior to visit.        Past Medical History:   Diagnosis Date    Allergic     Ankylosing spondylitis of site in spine     Anorexia nervosa     Arthritis of back     Asthma     Autism spectrum disorder     Bursitis of hip     Colon polyp     Crohn disease     Crohn disease     Depression     GERD " (gastroesophageal reflux disease)     Headache     Hip arthrosis     HTN (hypertension)     Irritable bowel syndrome     Kidney stone     Low back pain     MS (multiple sclerosis)     Obesity     Pneumonia     PTSD (post-traumatic stress disorder)     Rheumatoid arthritis     Vitamin D deficiency       Past Surgical History:   Procedure Laterality Date    CHOLECYSTECTOMY      COLON RESECTION      COLONOSCOPY  09/02/2019    MOUTH SURGERY  08/02/2021      Family History   Problem Relation Age of Onset    Diabetes Mother     Hypertension Mother     Atrial fibrillation Mother     Hypertension Father     Crohn's disease Father     Hypertension Maternal Grandmother     Hypertension Maternal Grandfather     Cancer Maternal Grandfather         SKIN CANCER    Hypertension Paternal Grandmother     Hypertension Paternal Grandfather     Heart disease Paternal Grandfather       Social History     Socioeconomic History    Marital status:      Spouse name: Juan Cline   Tobacco Use    Smoking status: Light Smoker     Current packs/day: 0.25     Average packs/day: 0.3 packs/day for 13.1 years (3.3 ttl pk-yrs)     Types: Cigarettes     Start date: 2/1/2012     Passive exposure: Current    Smokeless tobacco: Never    Tobacco comments:     Social smoking   Vaping Use    Vaping status: Former    Substances: Nicotine, Flavoring    Devices: Disposable    Passive vaping exposure: Yes   Substance and Sexual Activity    Alcohol use: Yes     Comment: occasionally    Drug use: Yes     Types: Marijuana     Comment: for pain management. Homemde THC oil    Sexual activity: Not Currently         Office Visit on 03/19/2025   Component Date Value Ref Range Status    SARS Antigen 03/19/2025 Not Detected  Not Detected, Presumptive Negative Final    Influenza A Antigen IGGY 03/19/2025 Not Detected  Not Detected Final    Influenza B Antigen IGGY 03/19/2025 Not Detected  Not Detected Final    Internal Control 03/19/2025 Passed  Passed Final     Lot Number 03/19/2025 [id number]   Final    Expiration Date 03/19/2025 11/21/2025   Final   Admission on 03/02/2025, Discharged on 03/02/2025   Component Date Value Ref Range Status    Lactate 03/02/2025 0.9  0.5 - 2.0 mmol/L Final    Serial Number: [id number]:  [id number]         Physical Exam  Vitals and nursing note reviewed.   Constitutional:       Appearance: Normal appearance. Rachelle is normal weight.   HENT:      Head: Normocephalic and atraumatic.      Left Ear: Decreased hearing noted. A middle ear effusion is present. Tympanic membrane is erythematous and bulging.      Ears:      Comments: Significant green discoloration of fluid behind tympanic eardrum of left ear, bulging tympanic membrane     Nose: Congestion and rhinorrhea present.   Cardiovascular:      Rate and Rhythm: Normal rate and regular rhythm.      Pulses: Normal pulses.      Heart sounds: Normal heart sounds. No murmur heard.     No friction rub. No gallop.   Pulmonary:      Effort: Pulmonary effort is normal. No respiratory distress.      Breath sounds: Normal breath sounds. No stridor. No wheezing, rhonchi or rales.   Chest:      Chest wall: No tenderness.   Abdominal:      General: Abdomen is flat. Bowel sounds are normal. There is no distension.      Palpations: Abdomen is soft. There is no mass.      Tenderness: There is no abdominal tenderness. There is no right CVA tenderness, left CVA tenderness, guarding or rebound.      Hernia: No hernia is present.   Skin:     General: Skin is warm and dry.      Capillary Refill: Capillary refill takes less than 2 seconds.      Coloration: Skin is not jaundiced or pale.   Neurological:      General: No focal deficit present.      Mental Status: Rachelle is alert and oriented to person, place, and time. Mental status is at baseline.      Motor: No weakness.      Coordination: Coordination normal.      Gait: Gait normal.   Psychiatric:         Mood and Affect: Mood normal.         Behavior: Behavior  normal.         Thought Content: Thought content normal.         Judgment: Judgment normal.          Physical Exam  The tympanic membrane in the left ear is yellowish in color, swollen, and bulging.       Result Review  Data Reviewed:{ Labs  Result Review  Imaging  Med Tab  Media :23}   I have reviewed this patient's chart.  I have reviewed previous labs, previous imaging, previous medications, and previous encounters with notes that were available in this patient's chart.    Results  Laboratory Studies  COVID-19 test was negative. Influenza test was negative. Strep test was negative.              Assessment and Plan {CC Problem List  Visit Diagnosis  ROS  Review (Popup)  [facility]  BestPractice  Medications  SmartSets  SnapShot Encounters  Media :23}   Diagnoses and all orders for this visit:    1. Acute suppurative otitis media of left ear without spontaneous rupture of tympanic membrane, recurrence not specified (Primary)  -     amoxicillin-clavulanate (AUGMENTIN) 875-125 MG per tablet; Take 1 tablet by mouth 2 (Two) Times a Day for 14 days.  Dispense: 28 tablet; Refill: 0    2. Congestion of both ears  -     POCT SARS-CoV-2 + Flu Antigen IGGY    3. Otalgia, unspecified laterality  -     POCT SARS-CoV-2 + Flu Antigen IGGY  -     predniSONE (DELTASONE) 20 MG tablet; Take 1 tablet by mouth Daily.  Dispense: 7 tablet; Refill: 0    4. Sore throat  -     POCT SARS-CoV-2 + Flu Antigen IGGY    5. Rhinorrhea  -     POCT SARS-CoV-2 + Flu Antigen IGGY    6. Chronic pain of left knee  -     gabapentin (NEURONTIN) 300 MG capsule; Take 1 capsule by mouth 3 (Three) Times a Day.  Dispense: 90 capsule; Refill: 1    7. Chronic left shoulder pain  -     gabapentin (NEURONTIN) 300 MG capsule; Take 1 capsule by mouth 3 (Three) Times a Day.  Dispense: 90 capsule; Refill: 1    8. Inflammation of left external ear  -     predniSONE (DELTASONE) 20 MG tablet; Take 1 tablet by mouth Daily.  Dispense: 7  tablet; Refill: 0        Assessment & Plan  1. Ear infection.  The patient presents with a recurrent ear infection, characterized by a yellowish-greenish discoloration of the tympanic membrane, which is swollen and bulging. A 14-day course of Augmentin has been prescribed, with instructions to complete the full course if symptoms persist after 10 days. Additionally, a 7-day supply of prednisone 20 mg has been provided, with the option to reduce the dosage to 10 mg if necessary.    2. Sinus infection.  The patient reports significant sinus drainage and a history of frequent sinus infections. The prescribed 14-day course of Augmentin should also address the sinus infection. If symptoms persist, further evaluation may be necessary.    3. Post-traumatic stress disorder (PTSD).  The patient is currently on sertraline 100 mg, which has affected her appetite. She has been advised to continue her current medication regimen and monitor her symptoms. If her condition does not improve, she should return for a follow-up visit to discuss potential adjustments to her treatment plan.    4. Chronic left shoulder and knee pain.  The dosage of gabapentin will be increased from 100 mg to 300 mg, to be taken three times daily. This adjustment aims to better manage her chronic pain. If her pain persists, she should return for a follow-up visit to discuss alternative treatment options.         -ER red flags discussed with patient including risk versus benefit and education provided.  -Follow-up with me in 2 weeks if not improving.  If improving follow-up in 3 months and next routine visit.    I spent 30 minutes caring for Rachelle on this date of service. This time includes time spent by me in the following activities:preparing for the visit, reviewing tests, obtaining and/or reviewing a separately obtained history, performing a medically appropriate examination and/or evaluation , counseling and educating the patient/family/caregiver,  ordering medications, tests, or procedures, referring and communicating with other health care professionals , documenting information in the medical record, independently interpreting results and communicating that information with the patient/family/caregiver, and care coordination.    Follow Up {Instructions Charge Capture  Follow-up Communications :23}     Patient was given instructions and counseling regarding Rachelle's condition or for health maintenance advice. Please see specific information pulled into the AVS (placed there by myself) if appropriate.    Return in about 2 weeks (around 4/2/2025), or if symptoms worsen or fail to improve, for Recheck.    BMI is >= 25 and <30. (Overweight) The following options were offered after discussion;: exercise counseling/recommendations and nutrition counseling/recommendations         TELLO Solomon, [facility]-BC      Patient or patient representative verbalized consent for the use of Ambient Listening during the visit with  TELLO Solomon for chart documentation. 3/19/2025  12:35 EDT

## 2025-03-20 DIAGNOSIS — H66.002 ACUTE SUPPURATIVE OTITIS MEDIA OF LEFT EAR WITHOUT SPONTANEOUS RUPTURE OF TYMPANIC MEMBRANE, RECURRENCE NOT SPECIFIED: ICD-10-CM

## 2025-03-20 DIAGNOSIS — M25.562 CHRONIC PAIN OF LEFT KNEE: ICD-10-CM

## 2025-03-20 DIAGNOSIS — G89.29 CHRONIC LEFT SHOULDER PAIN: ICD-10-CM

## 2025-03-20 DIAGNOSIS — H60.92 INFLAMMATION OF LEFT EXTERNAL EAR: ICD-10-CM

## 2025-03-20 DIAGNOSIS — H92.09 OTALGIA, UNSPECIFIED LATERALITY: ICD-10-CM

## 2025-03-20 DIAGNOSIS — G89.29 CHRONIC PAIN OF LEFT KNEE: ICD-10-CM

## 2025-03-20 DIAGNOSIS — M25.512 CHRONIC LEFT SHOULDER PAIN: ICD-10-CM

## 2025-03-20 NOTE — TELEPHONE ENCOUNTER
Rx Refill Note  Requested Prescriptions     Pending Prescriptions Disp Refills    gabapentin (NEURONTIN) 300 MG capsule 90 capsule 1     Sig: Take 1 capsule by mouth 3 (Three) Times a Day.    predniSONE (DELTASONE) 20 MG tablet 7 tablet 0     Sig: Take 1 tablet by mouth Daily.    amoxicillin-clavulanate (AUGMENTIN) 875-125 MG per tablet 28 tablet 0     Sig: Take 1 tablet by mouth 2 (Two) Times a Day for 14 days.      Last office visit with prescribing clinician: 3/19/2025   Last telemedicine visit with prescribing clinician: Visit date not found       UDS    12/03/2020  CSA     05/09/2024    INSPECT - SCAN - INSPECT/ [facility] [place]/ 30/20/2025 (03/20/2025)       Message from Patient  Hi. It looks like the Rx for gabapentin was overlooked (yay, squirrels) the augmentin and prednisone was sent to [facility]. If you could (re)send it, that would be great. Thanks. GAVIN Stevens MA  03/20/25, 08:56 EDT

## 2025-03-21 RX ORDER — PREDNISONE 20 MG/1
20 TABLET ORAL DAILY
Qty: 7 TABLET | Refills: 0 | OUTPATIENT
Start: 2025-03-21

## 2025-03-21 RX ORDER — GABAPENTIN 300 MG/1
300 CAPSULE ORAL 3 TIMES DAILY
Qty: 90 CAPSULE | Refills: 1 | OUTPATIENT
Start: 2025-03-21

## 2025-03-22 DIAGNOSIS — R06.02 SHORTNESS OF BREATH: ICD-10-CM

## 2025-03-24 RX ORDER — ALBUTEROL SULFATE 90 UG/1
2 AEROSOL, METERED RESPIRATORY (INHALATION) EVERY 6 HOURS PRN
Qty: 18 G | Refills: 0 | Status: SHIPPED | OUTPATIENT
Start: 2025-03-24

## 2025-04-17 ENCOUNTER — TELEPHONE (OUTPATIENT)
Dept: FAMILY MEDICINE CLINIC | Facility: CLINIC | Age: 50
End: 2025-04-17

## 2025-04-17 NOTE — TELEPHONE ENCOUNTER
Caller: Rachelle Hummel    Relationship: Self    Best call back number: [phone]    What specialty or service is being requested: GASTRO     What is the provider, practice or medical service name: DR. MATHUR     What is the office location: [facility]     Any additional details: PT STATES HER INSURANCE REQUIRES AN UPDATED REFERRAL EVERY YEAR. PT IS ALREADY ESTABLISHED WITH THEM.

## 2025-04-18 ENCOUNTER — OFFICE VISIT (OUTPATIENT)
Dept: FAMILY MEDICINE CLINIC | Facility: CLINIC | Age: 50
End: 2025-04-18
Payer: MEDICAID

## 2025-04-18 VITALS
BODY MASS INDEX: 26.41 KG/M2 | RESPIRATION RATE: 18 BRPM | TEMPERATURE: 97.7 F | DIASTOLIC BLOOD PRESSURE: 86 MMHG | WEIGHT: 195 LBS | HEIGHT: 72 IN | OXYGEN SATURATION: 98 % | SYSTOLIC BLOOD PRESSURE: 121 MMHG | HEART RATE: 95 BPM

## 2025-04-18 DIAGNOSIS — K50.919 CROHN'S DISEASE WITH COMPLICATION, UNSPECIFIED GASTROINTESTINAL TRACT LOCATION: Primary | ICD-10-CM

## 2025-04-18 DIAGNOSIS — Z13.1 SCREENING FOR DIABETES MELLITUS: ICD-10-CM

## 2025-04-18 DIAGNOSIS — Z13.228 SCREENING FOR ENDOCRINE, METABOLIC AND IMMUNITY DISORDER: ICD-10-CM

## 2025-04-18 DIAGNOSIS — R06.02 SHORTNESS OF BREATH: ICD-10-CM

## 2025-04-18 DIAGNOSIS — Z13.29 SCREENING FOR ENDOCRINE, METABOLIC AND IMMUNITY DISORDER: ICD-10-CM

## 2025-04-18 DIAGNOSIS — Z13.220 SCREENING FOR LIPID DISORDERS: ICD-10-CM

## 2025-04-18 DIAGNOSIS — M06.9 RHEUMATOID ARTHRITIS, INVOLVING UNSPECIFIED SITE, UNSPECIFIED WHETHER RHEUMATOID FACTOR PRESENT: ICD-10-CM

## 2025-04-18 DIAGNOSIS — R10.84 GENERALIZED ABDOMINAL PAIN: ICD-10-CM

## 2025-04-18 DIAGNOSIS — Z13.29 SCREENING FOR THYROID DISORDER: ICD-10-CM

## 2025-04-18 DIAGNOSIS — Z13.0 SCREENING FOR ENDOCRINE, METABOLIC AND IMMUNITY DISORDER: ICD-10-CM

## 2025-04-18 DIAGNOSIS — J30.2 SEASONAL ALLERGIES: ICD-10-CM

## 2025-04-18 PROCEDURE — 83036 HEMOGLOBIN GLYCOSYLATED A1C: CPT | Performed by: REGISTERED NURSE

## 2025-04-18 PROCEDURE — 80050 GENERAL HEALTH PANEL: CPT | Performed by: REGISTERED NURSE

## 2025-04-18 PROCEDURE — 80061 LIPID PANEL: CPT | Performed by: REGISTERED NURSE

## 2025-04-18 RX ORDER — ALBUTEROL SULFATE 90 UG/1
2 AEROSOL, METERED RESPIRATORY (INHALATION) EVERY 6 HOURS PRN
Qty: 18 G | Refills: 0 | Status: SHIPPED | OUTPATIENT
Start: 2025-04-18

## 2025-04-18 RX ORDER — DICYCLOMINE HYDROCHLORIDE 10 MG/1
10 CAPSULE ORAL
Qty: 120 CAPSULE | Refills: 1 | Status: SHIPPED | OUTPATIENT
Start: 2025-04-18
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
## 2025-04-18 NOTE — PROGRESS NOTES
Venipuncture Blood Specimen Collection  Venipuncture performed in RT ARM by Young Zapata with good hemostasis. Patient tolerated the procedure well without complications.   04/18/25   Young Zapata

## 2025-04-19 LAB
ALBUMIN SERPL-MCNC: 4.1 G/DL (ref 3.5–5.2)
ALBUMIN/GLOB SERPL: 1 G/DL
ALP SERPL-CCNC: 93 U/L (ref 39–117)
ALT SERPL W P-5'-P-CCNC: 13 U/L (ref 1–33)
ANION GAP SERPL CALCULATED.3IONS-SCNC: 14.3 MMOL/L (ref 5–15)
AST SERPL-CCNC: 16 U/L (ref 1–32)
BASOPHILS # BLD AUTO: 0.11 10*3/MM3 (ref 0–0.2)
BASOPHILS NFR BLD AUTO: 1.7 % (ref 0–1.5)
BILIRUB SERPL-MCNC: 0.3 MG/DL (ref 0–1.2)
BUN SERPL-MCNC: 6 MG/DL (ref 6–20)
BUN/CREAT SERPL: 6.8 (ref 7–25)
CALCIUM SPEC-SCNC: 10 MG/DL (ref 8.6–10.5)
CHLORIDE SERPL-SCNC: 102 MMOL/L (ref 98–107)
CHOLEST SERPL-MCNC: 183 MG/DL (ref 0–200)
CO2 SERPL-SCNC: 23.7 MMOL/L (ref 22–29)
CREAT SERPL-MCNC: 0.88 MG/DL (ref 0.57–1)
DEPRECATED RDW RBC AUTO: 46.5 FL (ref 37–54)
EGFRCR SERPLBLD CKD-EPI 2021: 81.2 ML/MIN/1.73
EOSINOPHIL # BLD AUTO: 0.69 10*3/MM3 (ref 0–0.4)
EOSINOPHIL NFR BLD AUTO: 10.4 % (ref 0.3–6.2)
ERYTHROCYTE [DISTWIDTH] IN BLOOD BY AUTOMATED COUNT: 14.1 % (ref 12.3–15.4)
GLOBULIN UR ELPH-MCNC: 4.1 GM/DL
GLUCOSE SERPL-MCNC: 115 MG/DL (ref 65–99)
HBA1C MFR BLD: 5.8 % (ref 4.8–5.6)
HCT VFR BLD AUTO: 42.7 % (ref 34–46.6)
HDLC SERPL-MCNC: 50 MG/DL (ref 40–60)
HGB BLD-MCNC: 14 G/DL (ref 12–15.9)
IMM GRANULOCYTES # BLD AUTO: 0.02 10*3/MM3 (ref 0–0.05)
IMM GRANULOCYTES NFR BLD AUTO: 0.3 % (ref 0–0.5)
LDLC SERPL CALC-MCNC: 118 MG/DL (ref 0–100)
LDLC/HDLC SERPL: 2.34 {RATIO}
LYMPHOCYTES # BLD AUTO: 1.95 10*3/MM3 (ref 0.7–3.1)
LYMPHOCYTES NFR BLD AUTO: 29.5 % (ref 19.6–45.3)
MCH RBC QN AUTO: 29.5 PG (ref 26.6–33)
MCHC RBC AUTO-ENTMCNC: 32.8 G/DL (ref 31.5–35.7)
MCV RBC AUTO: 90.1 FL (ref 79–97)
MONOCYTES # BLD AUTO: 0.55 10*3/MM3 (ref 0.1–0.9)
MONOCYTES NFR BLD AUTO: 8.3 % (ref 5–12)
NEUTROPHILS NFR BLD AUTO: 3.29 10*3/MM3 (ref 1.7–7)
NEUTROPHILS NFR BLD AUTO: 49.8 % (ref 42.7–76)
NRBC BLD AUTO-RTO: 0 /100 WBC (ref 0–0.2)
PLATELET # BLD AUTO: 399 10*3/MM3 (ref 140–450)
PMV BLD AUTO: 9.7 FL (ref 6–12)
POTASSIUM SERPL-SCNC: 4 MMOL/L (ref 3.5–5.2)
PROT SERPL-MCNC: 8.2 G/DL (ref 6–8.5)
RBC # BLD AUTO: 4.74 10*6/MM3 (ref 3.77–5.28)
SODIUM SERPL-SCNC: 140 MMOL/L (ref 136–145)
TRIGL SERPL-MCNC: 81 MG/DL (ref 0–150)
TSH SERPL DL<=0.05 MIU/L-ACNC: 2.06 UIU/ML (ref 0.27–4.2)
VLDLC SERPL-MCNC: 15 MG/DL (ref 5–40)
WBC NRBC COR # BLD AUTO: 6.61 10*3/MM3 (ref 3.4–10.8)

## 2025-05-20 DIAGNOSIS — G89.29 CHRONIC LEFT SHOULDER PAIN: ICD-10-CM

## 2025-05-20 DIAGNOSIS — G89.29 CHRONIC PAIN OF LEFT KNEE: ICD-10-CM

## 2025-05-20 DIAGNOSIS — M25.512 CHRONIC LEFT SHOULDER PAIN: ICD-10-CM

## 2025-05-20 DIAGNOSIS — M25.562 CHRONIC PAIN OF LEFT KNEE: ICD-10-CM

## 2025-05-21 NOTE — TELEPHONE ENCOUNTER
Rx Refill Note  Requested Prescriptions     Pending Prescriptions Disp Refills    gabapentin (NEURONTIN) 300 MG capsule [Pharmacy Med Name: Gabapentin 300 MG Oral Capsule] 90 capsule 0     Sig: TAKE 1 CAPSULE BY MOUTH THREE TIMES DAILY      Last office visit with prescribing clinician: 04/18/2025  Last telemedicine visit with prescribing clinician: Visit date not found   Next office visit with prescribing clinician: Visit date not found       UDS 12/03/2020  CSA 05/09/2024      INSPECT - SCAN - Inspect/[facility]05/21/2025 (05/21/2025)       Shae Kim MA  05/21/25, 08:12 EDT

## 2025-05-22 RX ORDER — GABAPENTIN 300 MG/1
300 CAPSULE ORAL 3 TIMES DAILY
Qty: 90 CAPSULE | Refills: 0 | Status: SHIPPED | OUTPATIENT
Start: 2025-05-22

## 2025-06-19 DIAGNOSIS — F41.1 GENERALIZED ANXIETY DISORDER: Chronic | ICD-10-CM

## 2025-06-19 RX ORDER — CLONAZEPAM 0.5 MG/1
0.5 TABLET ORAL DAILY PRN
Qty: 10 TABLET | Refills: 0 | Status: SHIPPED | OUTPATIENT
Start: 2025-06-19

## 2025-06-19 NOTE — TELEPHONE ENCOUNTER
Rx Refill Note  Requested Prescriptions     Pending Prescriptions Disp Refills    clonazePAM (KlonoPIN) 0.5 MG tablet [Pharmacy Med Name: clonazePAM 0.5 MG Oral Tablet] 10 tablet 0     Sig: TAKE 1 TABLET BY MOUTH ONCE DAILY AS NEEDED FOR ANXIETY        Last office visit with prescribing clinician: 2/26/2025     Next office visit with prescribing clinician: 8/27/2025   Office Visit with Roxann Johnson MD (02/26/2025)     [place] Abbreviated Urine Drug Screen - (07/25/2024)   BEHAVIORAL HEALTH - SCAN - INSPECT REPORT SHIRLENE 06/19/2025 (06/19/2025)     Inspect Fill 5/19/25    Michelle Morales  06/19/25, 11:45 EDT

## 2025-06-23 DIAGNOSIS — G89.29 CHRONIC PAIN OF LEFT KNEE: ICD-10-CM

## 2025-06-23 DIAGNOSIS — M25.562 CHRONIC PAIN OF LEFT KNEE: ICD-10-CM

## 2025-06-23 DIAGNOSIS — M25.512 CHRONIC LEFT SHOULDER PAIN: ICD-10-CM

## 2025-06-23 DIAGNOSIS — G89.29 CHRONIC LEFT SHOULDER PAIN: ICD-10-CM

## 2025-06-23 RX ORDER — GABAPENTIN 300 MG/1
300 CAPSULE ORAL 3 TIMES DAILY
Qty: 90 CAPSULE | Refills: 0 | Status: SHIPPED | OUTPATIENT
Start: 2025-06-23

## 2025-06-23 NOTE — TELEPHONE ENCOUNTER
Rx Refill Note  Requested Prescriptions     Pending Prescriptions Disp Refills    gabapentin (NEURONTIN) 300 MG capsule 90 capsule 0     Sig: Take 1 capsule by mouth 3 (Three) Times a Day.      Last office visit with prescribing clinician: 04/18/2025  Last telemedicine visit with prescribing clinician: Visit date not found   Next office visit with prescribing clinician: Visit date not found       UDS 12/03/2020  CSA 05/09/2024    INSPECT - SCAN - Inspect/[facility] [place] 06/23/2025 (06/23/2025)         Shae Kim MA  06/23/25, 13:01 EDT

## 2025-07-20 DIAGNOSIS — F41.1 GENERALIZED ANXIETY DISORDER: Chronic | ICD-10-CM

## 2025-07-21 DIAGNOSIS — M25.562 CHRONIC PAIN OF LEFT KNEE: ICD-10-CM

## 2025-07-21 DIAGNOSIS — M25.512 CHRONIC LEFT SHOULDER PAIN: ICD-10-CM

## 2025-07-21 DIAGNOSIS — G89.29 CHRONIC LEFT SHOULDER PAIN: ICD-10-CM

## 2025-07-21 DIAGNOSIS — G89.29 CHRONIC PAIN OF LEFT KNEE: ICD-10-CM

## 2025-07-21 RX ORDER — CLONAZEPAM 0.5 MG/1
0.5 TABLET ORAL DAILY PRN
Qty: 10 TABLET | Refills: 0 | Status: SHIPPED | OUTPATIENT
Start: 2025-07-21

## 2025-07-21 NOTE — TELEPHONE ENCOUNTER
HUB to relay description below.      LVM FOR PT TO CALL OFFICE AND SCHEDULE MICHELLE TO GET MED REFILLS

## 2025-07-21 NOTE — TELEPHONE ENCOUNTER
Rx Refill Note  Requested Prescriptions     Pending Prescriptions Disp Refills    clonazePAM (KlonoPIN) 0.5 MG tablet [Pharmacy Med Name: clonazePAM 0.5 MG Oral Tablet] 10 tablet 0     Sig: TAKE 1 TABLET BY MOUTH ONCE DAILY AS NEEDED FOR ANXIETY        Last office visit with prescribing clinician: 2/26/2025     Next office visit with prescribing clinician: 8/27/2025     Office Visit with Roxann Johnson MD (02/26/2025)     [place] Abbreviated Urine Drug Screen - (07/25/2024)     BEHAVIORAL HEALTH - SCAN - Inspect venessa, Elizabeth ALONSO, 7/21/25 (07/21/2025)     Inspect Fill Clonazepam 0.5 mg Qty 10 for 10 day filled 6/21/25    Michelle Morales  07/21/25, 08:56 EDT

## 2025-07-22 RX ORDER — GABAPENTIN 300 MG/1
300 CAPSULE ORAL 3 TIMES DAILY
Qty: 90 CAPSULE | Refills: 0 | OUTPATIENT
Start: 2025-07-22

## 2025-07-22 NOTE — TELEPHONE ENCOUNTER
PATIENT NEEDS ENOUGH GABAPENTIN TO GET THROUGH UNTIL PHYSICAL APPT       Name: Rachelle Hummel      Relationship: Self      Best Callback Number:     [phone] (Mobile)         HUB PROVIDED THE RELAY MESSAGE FROM THE OFFICE      PATIENT: SCHEDULED PER NOTE    ADDITIONAL INFORMATION:

## 2025-07-24 DIAGNOSIS — G89.29 CHRONIC PAIN OF LEFT KNEE: ICD-10-CM

## 2025-07-24 DIAGNOSIS — M25.512 CHRONIC LEFT SHOULDER PAIN: ICD-10-CM

## 2025-07-24 DIAGNOSIS — G89.29 CHRONIC LEFT SHOULDER PAIN: ICD-10-CM

## 2025-07-24 DIAGNOSIS — M25.562 CHRONIC PAIN OF LEFT KNEE: ICD-10-CM

## 2025-07-25 RX ORDER — GABAPENTIN 300 MG/1
300 CAPSULE ORAL 3 TIMES DAILY
Qty: 90 CAPSULE | Refills: 1 | Status: SHIPPED | OUTPATIENT
Start: 2025-07-25

## 2025-07-25 NOTE — TELEPHONE ENCOUNTER
Rx Refill Note  Requested Prescriptions     Pending Prescriptions Disp Refills    gabapentin (NEURONTIN) 300 MG capsule 90 capsule 0     Sig: Take 1 capsule by mouth 3 (Three) Times a Day.      Last office visit with prescribing clinician: 4/18/2025   Last telemedicine visit with prescribing clinician: Visit date not found       UDS  12/03/2020  [facility]   05/09/2024      INSPECT - SCAN - INSPECT/ [id number] [place]/ 07/25/2025 (07/25/2025)     Nguyen Stevens MA  07/25/25, 07:57 EDT

## 2025-08-22 ENCOUNTER — PREP FOR SURGERY (OUTPATIENT)
Dept: OTHER | Facility: HOSPITAL | Age: 50
End: 2025-08-22
Payer: OTHER GOVERNMENT

## 2025-08-22 DIAGNOSIS — Z12.11 ENCOUNTER FOR SCREENING COLONOSCOPY: Primary | ICD-10-CM

## 2025-08-22 RX ORDER — SODIUM CHLORIDE 9 MG/ML
30 INJECTION, SOLUTION INTRAVENOUS CONTINUOUS PRN
OUTPATIENT
Start: 2025-08-22 | End: 2025-08-23

## 2025-08-27 ENCOUNTER — OFFICE VISIT (OUTPATIENT)
Dept: PSYCHIATRY | Facility: CLINIC | Age: 50
End: 2025-08-27
Payer: OTHER GOVERNMENT

## 2025-08-27 DIAGNOSIS — F43.10 PTSD (POST-TRAUMATIC STRESS DISORDER): Chronic | ICD-10-CM

## 2025-08-27 DIAGNOSIS — F31.4 BIPOLAR 1 DISORDER, DEPRESSED, SEVERE: Primary | Chronic | ICD-10-CM

## 2025-08-27 DIAGNOSIS — F41.1 GENERALIZED ANXIETY DISORDER: Chronic | ICD-10-CM

## 2025-08-27 PROCEDURE — 99214 OFFICE O/P EST MOD 30 MIN: CPT | Performed by: PSYCHIATRY & NEUROLOGY

## 2025-08-27 PROCEDURE — 1160F RVW MEDS BY RX/DR IN RCRD: CPT | Performed by: PSYCHIATRY & NEUROLOGY

## 2025-08-27 PROCEDURE — 1159F MED LIST DOCD IN RCRD: CPT | Performed by: PSYCHIATRY & NEUROLOGY

## 2025-08-27 RX ORDER — CLONAZEPAM 0.5 MG/1
0.5 TABLET ORAL DAILY PRN
Qty: 10 TABLET | Refills: 1 | Status: SHIPPED | OUTPATIENT
Start: 2025-08-27

## 2025-08-27 RX ORDER — SERTRALINE HYDROCHLORIDE 100 MG/1
150 TABLET, FILM COATED ORAL DAILY
Qty: 135 TABLET | Refills: 1 | Status: SHIPPED | OUTPATIENT
Start: 2025-08-27